# Patient Record
Sex: MALE | Race: WHITE | NOT HISPANIC OR LATINO | URBAN - METROPOLITAN AREA
[De-identification: names, ages, dates, MRNs, and addresses within clinical notes are randomized per-mention and may not be internally consistent; named-entity substitution may affect disease eponyms.]

---

## 2017-02-13 ENCOUNTER — OFFICE VISIT (OUTPATIENT)
Dept: MEDICAL GROUP | Facility: PHYSICIAN GROUP | Age: 47
End: 2017-02-13
Payer: MEDICARE

## 2017-02-13 VITALS
HEIGHT: 73 IN | DIASTOLIC BLOOD PRESSURE: 72 MMHG | HEART RATE: 82 BPM | OXYGEN SATURATION: 97 % | BODY MASS INDEX: 27.04 KG/M2 | WEIGHT: 204 LBS | SYSTOLIC BLOOD PRESSURE: 126 MMHG | TEMPERATURE: 98.6 F | RESPIRATION RATE: 16 BRPM

## 2017-02-13 DIAGNOSIS — I10 ESSENTIAL HYPERTENSION: ICD-10-CM

## 2017-02-13 DIAGNOSIS — L72.3 SEBACEOUS CYST: ICD-10-CM

## 2017-02-13 DIAGNOSIS — F91.8 CONDUCT DISORDER, UNDIFFERENTIATED TYPE: ICD-10-CM

## 2017-02-13 DIAGNOSIS — D69.3 IDIOPATHIC THROMBOCYTOPENIC PURPURA (HCC): ICD-10-CM

## 2017-02-13 DIAGNOSIS — H61.23 IMPACTED CERUMEN OF BOTH EARS: ICD-10-CM

## 2017-02-13 DIAGNOSIS — F98.8 ADD (ATTENTION DEFICIT DISORDER): ICD-10-CM

## 2017-02-13 DIAGNOSIS — F79 MENTAL RETARDATION: ICD-10-CM

## 2017-02-13 DIAGNOSIS — E78.5 DYSLIPIDEMIA: ICD-10-CM

## 2017-02-13 DIAGNOSIS — Z00.00 MEDICARE ANNUAL WELLNESS VISIT, SUBSEQUENT: ICD-10-CM

## 2017-02-13 DIAGNOSIS — L98.9 SKIN LESION OF HAND: ICD-10-CM

## 2017-02-13 PROCEDURE — 69210 REMOVE IMPACTED EAR WAX UNI: CPT | Mod: 50 | Performed by: FAMILY MEDICINE

## 2017-02-13 PROCEDURE — 4004F PT TOBACCO SCREEN RCVD TLK: CPT | Performed by: FAMILY MEDICINE

## 2017-02-13 PROCEDURE — G0439 PPPS, SUBSEQ VISIT: HCPCS | Mod: 25 | Performed by: FAMILY MEDICINE

## 2017-02-13 PROCEDURE — G8510 SCR DEP NEG, NO PLAN REQD: HCPCS | Performed by: FAMILY MEDICINE

## 2017-02-13 PROCEDURE — 99999 PR ANNUAL WELLNESS VISIT-INCLUDES PPPS SUBSEQUE*: CPT | Performed by: FAMILY MEDICINE

## 2017-02-13 RX ORDER — CLOBETASOL PROPIONATE 0.5 MG/G
CREAM TOPICAL
Qty: 30 G | Refills: 1 | Status: SHIPPED | OUTPATIENT
Start: 2017-02-13 | End: 2019-01-31 | Stop reason: SDUPTHER

## 2017-02-13 ASSESSMENT — PATIENT HEALTH QUESTIONNAIRE - PHQ9: CLINICAL INTERPRETATION OF PHQ2 SCORE: 0

## 2017-02-13 NOTE — MR AVS SNAPSHOT
"Juan J Montiel   2017 9:20 AM   Office Visit   MRN: [id number]    Department:  [facility]   Dept Phone:  [phone]    Description:  Male : 1970   Provider:  Jalyn Reaves M.D.           Reason for Visit     Annual Exam physical       Allergies as of 2017     No Known Allergies      You were diagnosed with     Medicare annual wellness visit, subsequent   [363944]       Essential hypertension   [1929746]       Dyslipidemia   [056334]       Idiopathic thrombocytopenic purpura (CMS-HCC)   [379482]       Sebaceous cyst   [706.2.ICD-9-CM]       Skin lesion of hand   [273881]       Conduct disorder, undifferentiated type   [707445]       ADD (attention deficit disorder)   [448205]         Vital Signs     Blood Pressure Pulse Temperature Respirations Height Weight    126/72 mmHg 82 37 °C (98.6 °F) 16 1.854 m (6' 1\") 92.534 kg (204 lb)    Body Mass Index Oxygen Saturation Smoking Status             26.92 kg/m2 97% Current Every Day Smoker         Basic Information     Date Of Birth Sex Race Ethnicity Preferred Language    1970 Male White Non- English      Your appointments     Aug 22, 2017  9:00 AM   Established Patient with Jalyn Reaves M.D.   [facility] (--)    [address]  [address]  [facility] [address] [phone]           You will be receiving a confirmation call a few days before your appointment from our automated call confirmation system.              Problem List              ICD-10-CM Priority Class Noted - Resolved    Mental retardation F79   Unknown - Present    Conduct disorder, undifferentiated type F91.8   Unknown - Present    ADD (attention deficit disorder) F98.8   2014 - Present    Dyslipidemia E78.5   3/3/2014 - Present    Essential hypertension I10   2015 - Present    Idiopathic thrombocytopenic purpura (CMS-HCC) D69.3   2015 - Present      Health Maintenance        Date Due Completion Dates    IMM DTaP/Tdap/Td Vaccine (2 - Td) " 5/13/2019 5/13/2009            Current Immunizations     Influenza TIV (IM) 2/4/2014    Influenza Vaccine Quad Inj (Preserved) 11/2/2016, 2/8/2016, 11/19/2014    Pneumococcal polysaccharide vaccine (PPSV-23) 5/13/2009    Tdap Vaccine 5/13/2009      Below and/or attached are the medications your provider expects you to take. Review all of your home medications and newly ordered medications with your provider and/or pharmacist. Follow medication instructions as directed by your provider and/or pharmacist. Please keep your medication list with you and share with your provider. Update the information when medications are discontinued, doses are changed, or new medications (including over-the-counter products) are added; and carry medication information at all times in the event of emergency situations     Allergies:  No Known Allergies          Medications  Valid as of: February 13, 2017 -  9:59 AM    Generic Name Brand Name Tablet Size Instructions for use    Aluminum Chloride (Solution) DRYSOL 20 % Apply to soles of feet at night then wash off the next day.        ARIPiprazole (Tab) ABILIFY 15 MG Take 15 mg by mouth every morning.        ARIPiprazole (Tab) ABILIFY 5 MG Take 5 mg by mouth every evening.        Clobetasol Prop Emollient Base (Cream) Clobetasol Prop Emollient Base 0.05 % Apply thinly to affected areas twice daily.        Clotrimazole (Cream) LOTRIMIN 1 % Apply  to affected area(s) 2 times a day. As directed.        HydroCHLOROthiazide (Cap) MICROZIDE 12.5 MG TAKE ONE CAPSULE BY MOUTH EVERYDAY        Multiple Vitamin (Tab) MULTI-VITAMINS  TAKE ONE TABLET BY MOUTH ONCE DAILY        .                 Medicines prescribed today were sent to:     [facility]  [place] [address]    [address] [facility] [address] [facility] [address]    Phone: [phone] Fax: [phone]    Open 24 Hours?: No    [facility] - [place] [address]    [address] [facility] [address] [place]  [place] [address]    Phone: [phone] Fax: [phone]    Open 24 Hours?: No      Medication refill instructions:       If your prescription bottle indicates you have medication refills left, it is not necessary to call your provider’s office. Please contact your pharmacy and they will refill your medication.    If your prescription bottle indicates you do not have any refills left, you may request refills at any time through one of the following ways: The online [employer] system (except Urgent Care), by calling your provider’s office, or by asking your pharmacy to contact your provider’s office with a refill request. Medication refills are processed only during regular business hours and may not be available until the next business day. Your provider may request additional information or to have a follow-up visit with you prior to refilling your medication.   *Please Note: Medication refills are assigned a new Rx number when refilled electronically. Your pharmacy may indicate that no refills were authorized even though a new prescription for the same medication is available at the pharmacy. Please request the medicine by name with the pharmacy before contacting your provider for a refill.        Your To Do List     Future Labs/Procedures Complete By Expires    CBC WITH DIFFERENTIAL  As directed 2/14/2018    COMP METABOLIC PANEL  As directed 2/14/2018    LIPID PROFILE  As directed 2/14/2018         [employer] Status: Patient Declined

## 2017-02-13 NOTE — PROGRESS NOTES
Subjective:      Juan J Montiel is a 46 y.o. male who presents with Annual Exam            Annual Exam     in terms of patient's hypertension, his blood pressures under control on hydrochlorothiazide.    He continues to manage his dyslipidemia with his own efforts. The last blood work in 2/16 came back all at target except for low HDL at 30. He did not do the blood work I ordered prior to this visit.    For his ITP, count ranges from 91-127K. Is followed by the hematologist. The last platelet count was 98 in February 2016.    He has a bump on the right side of the back that has been there for a while without any discomfort or pain.    He continues to have breakouts of non-itchy than painful rash on the skin overlying the MCP joints of both hands. The steroid cream that I gave them takes care of the rash and makes them go away.    He continues to follow-up with a psychiatrist for conduct disorder and ADD. He continues to take Abilify prescribed by the psychiatrist.     Depression Screening    Little interest or pleasure in doing things?  0 - not at all  Feeling down, depressed , or hopeless? 0 - not at all  Patient Health Questionnaire Score: 0     If depressive symptoms identified deferred to follow up visit unless specifically addressed in assesment and plan.    Screening for Cognitive Impairment    Three Minute Recall (banana, sunrise, fence)  2/3    Draw clock face with all 12 numbers set to the hand to show 10 minures past 11 o'clock       Cognitive concerns identified defferred for follow up unless specifically addressed in assesment and plan.    Fall Risk Assessment    Has the patient had two or more falls in the last year or any fall with injury in the last year?       Safety Assessment    Throw rugs on floor.  Yes  Handrails on all stairs.  Yes  Good lighting in all hallways.  Yes  Difficulty hearing.  No  Patient counseled about all safety risks that were identified.    Functional Assessment ADLs    Are there  any barriers preventing you from cooking for yourself or meeting nutritional needs?  Yes.    Are there any barriers preventing you from driving safely or obtaining transportation?  Yes.    Are there any barriers preventing you from using a telephone or calling for help?  Yes.    Are there any barriers preventing you from shopping?  Yes.    Are there any barriers preventing you from taking care of your own finances?  Yes.    Are there any barriers preventing you from managing your medications?  Yes.    Are currently engaing any exercise or physical activity?  No.       Health Maintenance Summary                Annual Wellness Visit Overdue 2/8/2017      Done 2/8/2016 Visit Dx: Medicare annual wellness visit, subsequent     Patient has more history with this topic...    IMM DTaP/Tdap/Td Vaccine Next Due 5/13/2019      Done 5/13/2009 Imm Admin: Tdap Vaccine          Patient Care Team:  Jalyn Reaves M.D. as PCP - General    ROS     Review of Systems  Constitutional: Negative for fever, chills, weight loss and malaise/fatigue.   HEENT: Negative for ear pain, nosebleeds, congestion, sore throat and neck pain.    Eyes: Negative for blurred vision.   Respiratory: Negative for cough, sputum production, shortness of breath and wheezing.    Cardiovascular: Negative for chest pain, palpitations, orthopnea and leg swelling.   Gastrointestinal: Negative for heartburn, nausea, vomiting and abdominal pain.   Genitourinary: Negative for dysuria, urgency and frequency.   Musculoskeletal: Negative for myalgias, back pain and joint pain.   Skin: Positive for rash without itching.   Neurological: Negative for dizziness, tingling, tremors, sensory change, focal weakness and headaches.   Endo/Heme/Allergies: Does not bruise/bleed easily.   Psychiatric/Behavioral: Negative for depression, anxiety, or memory loss.     All other systems reviewed and are negative except as in HPI.         Objective:     /72 mmHg  Pulse 82  Temp(Src)  "37 °C (98.6 °F)  Resp 16  Ht 1.854 m (6' 1\")  Wt 92.534 kg (204 lb)  BMI 26.92 kg/m2  SpO2 97%     Physical Exam   Constitutional: He is oriented to person, place, and time. He appears well-developed and well-nourished. No distress.   HENT:   Head: Normocephalic and atraumatic.   Right Ear: External ear normal.   Left Ear: External ear normal.   Nose: Nose normal.   Mouth/Throat: Oropharynx is clear and moist. No oropharyngeal exudate.   Impacted cerumen both ear canals, tympanic membranes not visible   Eyes: Conjunctivae and EOM are normal. Pupils are equal, round, and reactive to light. Right eye exhibits no discharge. Left eye exhibits no discharge. No scleral icterus.   Neck: Normal range of motion. Neck supple. No JVD present. No tracheal deviation present. No thyromegaly present.   Cardiovascular: Normal rate, regular rhythm, normal heart sounds and intact distal pulses.  Exam reveals no gallop and no friction rub.    No murmur heard.  Pulmonary/Chest: Effort normal and breath sounds normal. No stridor. No respiratory distress. He has no wheezes. He has no rales. He exhibits no tenderness.   Abdominal: Soft. Bowel sounds are normal. He exhibits no distension and no mass. There is no tenderness. There is no rebound and no guarding. Hernia confirmed negative in the right inguinal area and confirmed negative in the left inguinal area.   Genitourinary: Testes normal and penis normal. Right testis shows no mass, no swelling and no tenderness. Right testis is descended. Left testis shows no mass, no swelling and no tenderness. Left testis is descended. Circumcised. No phimosis, penile erythema or penile tenderness. No discharge found.   Musculoskeletal: Normal range of motion. He exhibits no edema or tenderness.   Lymphadenopathy:     He has no cervical adenopathy. No inguinal adenopathy noted on the right or left side.        Right: No inguinal adenopathy present.        Left: No inguinal adenopathy present. "   Neurological: He is alert and oriented to person, place, and time. He displays normal reflexes. No cranial nerve deficit. He exhibits normal muscle tone. Coordination normal.   Skin: Skin is warm and dry. Rash (positive skin colored papules grouped together on the MCP joints right third and fourth fingers) noted. He is not diaphoretic. No erythema. No pallor.   2 x 2 centimeter sebaceous cyst right thoracic region below the scapula   Psychiatric: He has a normal mood and affect. His behavior is normal. Judgment and thought content normal.   Nursing note and vitals reviewed.                   Assessment/Plan:     1. Medicare annual wellness visit, subsequent  Annual Wellness Visit - Includes PPPS Subsequent ()    LIPID PROFILE    COMP METABOLIC PANEL    CBC WITH DIFFERENTIAL   2. Essential hypertension  LIPID PROFILE    COMP METABOLIC PANEL    CBC WITH DIFFERENTIAL   3. Dyslipidemia  LIPID PROFILE    COMP METABOLIC PANEL    CBC WITH DIFFERENTIAL   4. Idiopathic thrombocytopenic purpura (CMS-HCC)  LIPID PROFILE    COMP METABOLIC PANEL    CBC WITH DIFFERENTIAL   5. Sebaceous cyst  LIPID PROFILE    COMP METABOLIC PANEL    CBC WITH DIFFERENTIAL   6. Skin lesion of hand  LIPID PROFILE    COMP METABOLIC PANEL    CBC WITH DIFFERENTIAL    Clobetasol Prop Emollient Base 0.05 % Cream   7. Conduct disorder, undifferentiated type  LIPID PROFILE    COMP METABOLIC PANEL    CBC WITH DIFFERENTIAL   8. ADD (attention deficit disorder)  LIPID PROFILE    COMP METABOLIC PANEL    CBC WITH DIFFERENTIAL   9. Impacted cerumen both ears    1. Up-to-date with all his immunizations.  2. Controlled on hydrochlorothiazide.  3. He will do follow-up blood work. Continue on efforts.  4. This is stable. Platelet count continues to run within his baseline. He follows up with the hematologist every 6 months.  5. No evidence of infection. He does not have any discomfort or pain. We will just keep an eye on this.  6. I gave him a refill obvious  steroid cream which works. When he does the cream application and the area involved the problem goes away.  7. Continue follow-up with psychiatrist.  8. Continue follow-up with psychiatrist.  9. Cerumen removed both by irrigation and scooping them out with cerumen curette with good success. Agree examination showed intact tympanic membranes without evidence of infection.  Screening tests reviewed with patient and I updated the health maintenance record. Counseling done regarding general wall mass and lifestyle habits.      Please note that this dictation was created using voice recognition software. I have worked with consultants from the vendor as well as technical experts from [facility]  [employer] to optimize the interface. I have made every reasonable attempt to correct obvious errors, but I expect that there are errors of grammar and possibly content I did not discover before finalizing the note.

## 2017-02-15 ENCOUNTER — HOSPITAL ENCOUNTER (OUTPATIENT)
Dept: LAB | Facility: MEDICAL CENTER | Age: 47
End: 2017-02-15
Attending: FAMILY MEDICINE
Payer: MEDICARE

## 2017-02-15 DIAGNOSIS — E78.5 DYSLIPIDEMIA: ICD-10-CM

## 2017-02-15 DIAGNOSIS — L98.9 SKIN LESION OF HAND: ICD-10-CM

## 2017-02-15 DIAGNOSIS — F91.8 CONDUCT DISORDER, UNDIFFERENTIATED TYPE: ICD-10-CM

## 2017-02-15 DIAGNOSIS — Z00.00 MEDICARE ANNUAL WELLNESS VISIT, SUBSEQUENT: ICD-10-CM

## 2017-02-15 DIAGNOSIS — L72.3 SEBACEOUS CYST: ICD-10-CM

## 2017-02-15 DIAGNOSIS — D69.3 IDIOPATHIC THROMBOCYTOPENIC PURPURA (HCC): ICD-10-CM

## 2017-02-15 DIAGNOSIS — I10 ESSENTIAL HYPERTENSION: ICD-10-CM

## 2017-02-15 DIAGNOSIS — F98.8 ADD (ATTENTION DEFICIT DISORDER): ICD-10-CM

## 2017-02-15 LAB
ALBUMIN SERPL BCP-MCNC: 4 G/DL (ref 3.2–4.9)
ALBUMIN/GLOB SERPL: 1.4 G/DL
ALP SERPL-CCNC: 74 U/L (ref 30–99)
ALT SERPL-CCNC: 12 U/L (ref 2–50)
ANION GAP SERPL CALC-SCNC: 6 MMOL/L (ref 0–11.9)
AST SERPL-CCNC: 14 U/L (ref 12–45)
BASOPHILS # BLD AUTO: 0.04 K/UL (ref 0–0.12)
BASOPHILS NFR BLD AUTO: 0.7 % (ref 0–1.8)
BILIRUB SERPL-MCNC: 0.7 MG/DL (ref 0.1–1.5)
BUN SERPL-MCNC: 27 MG/DL (ref 8–22)
CALCIUM SERPL-MCNC: 9.1 MG/DL (ref 8.5–10.5)
CHLORIDE SERPL-SCNC: 106 MMOL/L (ref 96–112)
CHOLEST SERPL-MCNC: 139 MG/DL (ref 100–199)
CO2 SERPL-SCNC: 27 MMOL/L (ref 20–33)
CREAT SERPL-MCNC: 0.91 MG/DL (ref 0.5–1.4)
EOSINOPHIL # BLD: 0.26 K/UL (ref 0–0.51)
EOSINOPHIL NFR BLD AUTO: 4.4 % (ref 0–6.9)
ERYTHROCYTE [DISTWIDTH] IN BLOOD BY AUTOMATED COUNT: 41.6 FL (ref 35.9–50)
GLOBULIN SER CALC-MCNC: 2.8 G/DL (ref 1.9–3.5)
GLUCOSE SERPL-MCNC: 84 MG/DL (ref 65–99)
HCT VFR BLD AUTO: 48.5 % (ref 42–52)
HDLC SERPL-MCNC: 37 MG/DL
HGB BLD-MCNC: 15.8 G/DL (ref 14–18)
IMM GRANULOCYTES # BLD AUTO: 0.02 K/UL (ref 0–0.11)
IMM GRANULOCYTES NFR BLD AUTO: 0.3 % (ref 0–0.9)
LDLC SERPL CALC-MCNC: 79 MG/DL
LYMPHOCYTES # BLD: 2.25 K/UL (ref 1–4.8)
LYMPHOCYTES NFR BLD AUTO: 38.5 % (ref 22–41)
MCH RBC QN AUTO: 30.1 PG (ref 27–33)
MCHC RBC AUTO-ENTMCNC: 32.6 G/DL (ref 33.7–35.3)
MCV RBC AUTO: 92.4 FL (ref 81.4–97.8)
MONOCYTES # BLD: 0.38 K/UL (ref 0–0.85)
MONOCYTES NFR BLD AUTO: 6.5 % (ref 0–13.4)
NEUTROPHILS # BLD: 2.9 K/UL (ref 1.82–7.42)
NEUTROPHILS NFR BLD AUTO: 49.6 % (ref 44–72)
NRBC # BLD AUTO: 0 K/UL
NRBC BLD-RTO: 0 /100 WBC
PLATELET # BLD AUTO: 116 K/UL (ref 164–446)
PMV BLD AUTO: 12 FL (ref 9–12.9)
POTASSIUM SERPL-SCNC: 3.9 MMOL/L (ref 3.6–5.5)
PROT SERPL-MCNC: 6.8 G/DL (ref 6–8.2)
RBC # BLD AUTO: 5.25 M/UL (ref 4.7–6.1)
SODIUM SERPL-SCNC: 139 MMOL/L (ref 135–145)
TRIGL SERPL-MCNC: 116 MG/DL (ref 0–149)
WBC # BLD AUTO: 5.9 K/UL (ref 4.8–10.8)

## 2017-02-15 PROCEDURE — 36415 COLL VENOUS BLD VENIPUNCTURE: CPT

## 2017-02-15 PROCEDURE — 80061 LIPID PANEL: CPT

## 2017-02-15 PROCEDURE — 85025 COMPLETE CBC W/AUTO DIFF WBC: CPT

## 2017-02-15 PROCEDURE — 80053 COMPREHEN METABOLIC PANEL: CPT

## 2017-02-16 ENCOUNTER — TELEPHONE (OUTPATIENT)
Dept: MEDICAL GROUP | Facility: PHYSICIAN GROUP | Age: 47
End: 2017-02-16

## 2017-02-16 NOTE — Clinical Note
February 16, 2017        Juan J Montiel  [address] [facility] [address]        Dear Juan J    Here are the results of your test(s):     Expand All Collapse All    ----- Message from Jalyn Reaves M.D. sent at 2/16/2017 6:45 AM PST -----   Cholesterol panel improved and HDL or good cholesterol increased from 30-37. Distal needs to be 40 or higher. Exercise or increase activity will make this better.   LDL or bad cholesterol good at 79.   Triglycerides also good at 116. This needs to be below 150.   No diabetes.   He is a little dehydrated so he needs to increase his water intake.   Liver function is normal.   No anemia.   Platelet count slightly low and about the same as before.                Sincerely,        Nora Hutchinson  Signed Electronically

## 2017-02-16 NOTE — TELEPHONE ENCOUNTER
1. Caller Name: Juan J Montiel                                         Call Back Number: [phone] (home)     2. Message: Sent letter to notify patient of normal results.     3. Patient approves office to leave a detailed voicemail/MyChart message: N\A

## 2017-02-16 NOTE — TELEPHONE ENCOUNTER
----- Message from Jalyn Reaves M.D. sent at 2/16/2017  6:45 AM PST -----  Cholesterol panel improved and HDL or good cholesterol increased from 30-37. Distal needs to be 40 or higher. Exercise or increase activity will make this better.  LDL or bad cholesterol good at 79.   Triglycerides also good at 116. This needs to be below 150.  No diabetes.  He is a little dehydrated so he needs to increase his water intake.  Liver function is normal.  No anemia.  Platelet count slightly low and about the same as before.

## 2017-03-29 RX ORDER — HYDROCHLOROTHIAZIDE 12.5 MG/1
CAPSULE, GELATIN COATED ORAL
Qty: 90 CAP | Refills: 3 | Status: SHIPPED | OUTPATIENT
Start: 2017-03-29 | End: 2018-02-23 | Stop reason: SDUPTHER

## 2017-03-29 RX ORDER — DIPHENOXYLATE HYDROCHLORIDE AND ATROPINE SULFATE 2.5; .025 MG/1; MG/1
TABLET ORAL
Qty: 90 TAB | Refills: 3 | Status: SHIPPED | OUTPATIENT
Start: 2017-03-29 | End: 2018-02-23 | Stop reason: SDUPTHER

## 2017-04-12 RX ORDER — ARIPIPRAZOLE 15 MG/1
15 TABLET ORAL EVERY MORNING
Qty: 30 TAB | Refills: 0 | OUTPATIENT
Start: 2017-04-12

## 2017-08-22 ENCOUNTER — OFFICE VISIT (OUTPATIENT)
Dept: MEDICAL GROUP | Facility: PHYSICIAN GROUP | Age: 47
End: 2017-08-22
Payer: MEDICARE

## 2017-08-22 VITALS
RESPIRATION RATE: 18 BRPM | SYSTOLIC BLOOD PRESSURE: 100 MMHG | DIASTOLIC BLOOD PRESSURE: 64 MMHG | HEIGHT: 73 IN | TEMPERATURE: 98.8 F | BODY MASS INDEX: 27.57 KG/M2 | HEART RATE: 87 BPM | WEIGHT: 208 LBS | OXYGEN SATURATION: 92 %

## 2017-08-22 DIAGNOSIS — D69.3 IDIOPATHIC THROMBOCYTOPENIC PURPURA (HCC): ICD-10-CM

## 2017-08-22 DIAGNOSIS — I10 ESSENTIAL HYPERTENSION: ICD-10-CM

## 2017-08-22 DIAGNOSIS — F98.8 ADD (ATTENTION DEFICIT DISORDER): ICD-10-CM

## 2017-08-22 DIAGNOSIS — E78.5 DYSLIPIDEMIA: ICD-10-CM

## 2017-08-22 DIAGNOSIS — F91.8 CONDUCT DISORDER, UNDIFFERENTIATED TYPE: ICD-10-CM

## 2017-08-22 PROCEDURE — 99214 OFFICE O/P EST MOD 30 MIN: CPT | Performed by: FAMILY MEDICINE

## 2017-08-22 ASSESSMENT — PAIN SCALES - GENERAL: PAINLEVEL: NO PAIN

## 2017-08-22 NOTE — MR AVS SNAPSHOT
"        Juan J Montiel   2017 9:00 AM   Office Visit   MRN: [id number]    Department:  [facility]   Dept Phone:  [phone]    Description:  Male : 1970   Provider:  Jalyn Reaves M.D.           Reason for Visit     Follow-Up 6 month follow up      Allergies as of 2017     No Known Allergies      You were diagnosed with     Essential hypertension   [8616873]       Dyslipidemia   [273493]       Idiopathic thrombocytopenic purpura (CMS-HCC)   [830517]       Conduct disorder, undifferentiated type   [528512]       ADD (attention deficit disorder)   [275320]         Vital Signs     Blood Pressure Pulse Temperature Respirations Height Weight    100/64 mmHg 87 37.1 °C (98.8 °F) 18 1.854 m (6' 0.99\") 94.348 kg (208 lb)    Body Mass Index Oxygen Saturation Smoking Status             27.45 kg/m2 92% Current Every Day Smoker         Basic Information     Date Of Birth Sex Race Ethnicity Preferred Language    1970 Male White Non- English      Your appointments     2018  9:20 AM   Established Patient with Jalyn Reaves M.D.   [facility] (--)    [address]  [address]  [facility] [address] [phone]           You will be receiving a confirmation call a few days before your appointment from our automated call confirmation system.              Problem List              ICD-10-CM Priority Class Noted - Resolved    Mental retardation F79   Unknown - Present    Conduct disorder, undifferentiated type F91.8   Unknown - Present    ADD (attention deficit disorder) F98.8   2014 - Present    Dyslipidemia E78.5   3/3/2014 - Present    Essential hypertension I10   2015 - Present    Idiopathic thrombocytopenic purpura (CMS-HCC) D69.3   2015 - Present      Health Maintenance        Date Due Completion Dates    IMM INFLUENZA (1) 2017, 2016, 2014, 2014    IMM DTaP/Tdap/Td Vaccine (2 - Td) 2019            Current Immunizations   "    Influenza TIV (IM) 2/4/2014    Influenza Vaccine Quad Inj (Preserved) 11/2/2016, 2/8/2016, 11/19/2014    Pneumococcal polysaccharide vaccine (PPSV-23) 5/13/2009    Tdap Vaccine 5/13/2009      Below and/or attached are the medications your provider expects you to take. Review all of your home medications and newly ordered medications with your provider and/or pharmacist. Follow medication instructions as directed by your provider and/or pharmacist. Please keep your medication list with you and share with your provider. Update the information when medications are discontinued, doses are changed, or new medications (including over-the-counter products) are added; and carry medication information at all times in the event of emergency situations     Allergies:  No Known Allergies          Medications  Valid as of: August 22, 2017 -  9:12 AM    Generic Name Brand Name Tablet Size Instructions for use    Aluminum Chloride (Solution) DRYSOL 20 % Apply to soles of feet at night then wash off the next day.        ARIPiprazole (Tab) ABILIFY 15 MG Take 15 mg by mouth every morning.        ARIPiprazole (Tab) ABILIFY 5 MG Take 5 mg by mouth every evening.        Clobetasol Prop Emollient Base (Cream) Clobetasol Prop Emollient Base 0.05 % Apply thinly to affected areas twice daily.        Clotrimazole (Cream) LOTRIMIN 1 % Apply  to affected area(s) 2 times a day. As directed.        HydroCHLOROthiazide (Cap) MICROZIDE 12.5 MG TAKE ONE CAPSULE BY MOUTH EVERYDAY        Multiple Vitamin (Tab) MULTI-VITAMINS  TAKE ONE TABLET BY MOUTH ONCE DAILY        .                 Medicines prescribed today were sent to:     [facility] - [address] #[facility] [address]    Phone: [phone] Fax: [phone]    Open 24 Hours?: No    [facility] - [address]    [address] [place] [address] [address] [facility] [address]    Phone: [phone] Fax: [phone]     Open 24 Hours?: No      Medication refill instructions:       If your prescription bottle indicates you have medication refills left, it is not necessary to call your provider’s office. Please contact your pharmacy and they will refill your medication.    If your prescription bottle indicates you do not have any refills left, you may request refills at any time through one of the following ways: The online [email or web address] system (except Urgent Care), by calling your provider’s office, or by asking your pharmacy to contact your provider’s office with a refill request. Medication refills are processed only during regular business hours and may not be available until the next business day. Your provider may request additional information or to have a follow-up visit with you prior to refilling your medication.   *Please Note: Medication refills are assigned a new Rx number when refilled electronically. Your pharmacy may indicate that no refills were authorized even though a new prescription for the same medication is available at the pharmacy. Please request the medicine by name with the pharmacy before contacting your provider for a refill.        Your To Do List     Future Labs/Procedures Complete By Expires    CBC WITH DIFFERENTIAL  As directed 8/23/2018    COMP METABOLIC PANEL  As directed 8/23/2018    LIPID PROFILE  As directed 8/23/2018         [email or web address] Status: Patient Declined        Quit Tobacco Information     Do you want to quit using tobacco?    Quitting tobacco decreases risks of cancer, heart and lung disease, increases life expectancy, improves sense of taste and smell, and increases spending money, among other benefits.    If you are thinking about quitting, we can help.  • [facility] Quit Tobacco Program: 909-202-6635  o Program occurs weekly for four weeks and includes pharmacist consultation on products to support quitting smoking or chewing tobacco. A provider referral is needed for pharmacist  consultation.  • Tobacco Users Help Hotline: 5-800-QUIT-NOW (760-0299) or https://[place].quitlogix.org/  o Free, confidential telephone and online coaching for [place] residents. Sessions are designed on a schedule that is convenient for you. Eligible clients receive free nicotine replacement therapy.  • Nationally: www.smokefree.gov  o Information and professional assistance to support both immediate and long-term needs as you become, and remain, a non-smoker. Smokefree.gov allows you to choose the help that best fits your needs.

## 2017-08-22 NOTE — PROGRESS NOTES
"Subjective:      Juan J Montiel is a 47 y.o. male who presents with Follow-Up            HPI     Patient returns for follow-up of his medical problems. He is accompanied by his caregiver.    He continues to take hydrochlorothiazide for hypertension with good control of his blood pressure.    In terms of his dyslipidemia, he continues to manage this with his own efforts. His main problem is low HDL. The last lipid panel in 2/17 came back the HDL increased from 30-37. The LDL increased from 51-79.    We also follow him for ITP. Platelet count ranges from 91-127k. He is also followed by his hematologist. His numbers have been stable.    He continues to see a psychiatrist for conduct disorder and ADD. He is on Abilify.    Past medical history, past surgical history, family history reviewed-no changes    Social history reviewed-no changes    Allergies reviewed-no changes    Medications reviewed-no changes        ROS     Review of systems as per history of present illness, the rest are negative.     Objective:     /64 mmHg  Pulse 87  Temp(Src) 37.1 °C (98.8 °F)  Resp 18  Ht 1.854 m (6' 0.99\")  Wt 94.348 kg (208 lb)  BMI 27.45 kg/m2  SpO2 92%     Physical Exam     Examined alert, awake, oriented, not in distress    Neck-supple, no lymphadenopathy, no thyromegaly  Lungs-clear to auscultation, no rales, no wheezes  Heart-regular rate and rhythm, no murmur  Extremities-no edema, clubbing, cyanosis          Assessment/Plan:     1. Essential hypertension  Controlled on hydrochlorothiazide.  - LIPID PROFILE; Future  - COMP METABOLIC PANEL; Future  - CBC WITH DIFFERENTIAL; Future    2. Dyslipidemia  HDL was improved but was still need this at 40 or higher. Advised to increase activity with regular exercises. Recheck lab work next visit in 6 months.  - LIPID PROFILE; Future  - COMP METABOLIC PANEL; Future  - CBC WITH DIFFERENTIAL; Future    3. Idiopathic thrombocytopenic purpura (CMS-HCC)  Last platelet count was 116k. " We will continue to follow and recheck CBC next visit.  - LIPID PROFILE; Future  - COMP METABOLIC PANEL; Future  - CBC WITH DIFFERENTIAL; Future    4. Conduct disorder, undifferentiated type  Stable on Abilify continue follow-up with psychiatrist.    5. ADD (attention deficit disorder)  Stable on Abilify. Continue follow-up with psychiatrist.      Please note that this dictation was created using voice recognition software. I have worked with consultants from the vendor as well as technical experts from [facility] to optimize the interface. I have made every reasonable attempt to correct obvious errors, but I expect that there are errors of grammar and possibly content I did not discover before finalizing the note.

## 2018-02-16 ENCOUNTER — TELEPHONE (OUTPATIENT)
Dept: MEDICAL GROUP | Facility: PHYSICIAN GROUP | Age: 48
End: 2018-02-16

## 2018-02-16 NOTE — TELEPHONE ENCOUNTER
Left message for patient to call back regarding pre-visit planning. Please transfer call to Lilia at [phone].

## 2018-02-24 RX ORDER — DIPHENOXYLATE HYDROCHLORIDE AND ATROPINE SULFATE 2.5; .025 MG/1; MG/1
TABLET ORAL
Qty: 90 TAB | Refills: 3 | Status: SHIPPED | OUTPATIENT
Start: 2018-02-24 | End: 2019-01-24 | Stop reason: SDUPTHER

## 2018-02-24 RX ORDER — HYDROCHLOROTHIAZIDE 12.5 MG/1
CAPSULE, GELATIN COATED ORAL
Qty: 90 CAP | Refills: 3 | Status: SHIPPED | OUTPATIENT
Start: 2018-02-24 | End: 2019-01-24 | Stop reason: SDUPTHER

## 2018-02-26 ENCOUNTER — OFFICE VISIT (OUTPATIENT)
Dept: MEDICAL GROUP | Facility: PHYSICIAN GROUP | Age: 48
End: 2018-02-26
Payer: MEDICARE

## 2018-02-26 VITALS
RESPIRATION RATE: 16 BRPM | HEART RATE: 72 BPM | TEMPERATURE: 98.6 F | OXYGEN SATURATION: 94 % | WEIGHT: 215.61 LBS | DIASTOLIC BLOOD PRESSURE: 82 MMHG | BODY MASS INDEX: 28.58 KG/M2 | HEIGHT: 73 IN | SYSTOLIC BLOOD PRESSURE: 112 MMHG

## 2018-02-26 DIAGNOSIS — H61.23 IMPACTED CERUMEN OF BOTH EARS: ICD-10-CM

## 2018-02-26 DIAGNOSIS — D69.3 IDIOPATHIC THROMBOCYTOPENIC PURPURA (HCC): ICD-10-CM

## 2018-02-26 DIAGNOSIS — F90.9 ATTENTION DEFICIT HYPERACTIVITY DISORDER (ADHD), UNSPECIFIED ADHD TYPE: ICD-10-CM

## 2018-02-26 DIAGNOSIS — F91.8 CONDUCT DISORDER, UNDIFFERENTIATED TYPE: ICD-10-CM

## 2018-02-26 DIAGNOSIS — F79 MENTAL RETARDATION: ICD-10-CM

## 2018-02-26 DIAGNOSIS — Z00.00 MEDICARE ANNUAL WELLNESS VISIT, SUBSEQUENT: ICD-10-CM

## 2018-02-26 DIAGNOSIS — E78.5 DYSLIPIDEMIA: ICD-10-CM

## 2018-02-26 DIAGNOSIS — I10 ESSENTIAL HYPERTENSION: ICD-10-CM

## 2018-02-26 DIAGNOSIS — Z23 NEED FOR IMMUNIZATION AGAINST INFLUENZA: ICD-10-CM

## 2018-02-26 PROCEDURE — G0008 ADMIN INFLUENZA VIRUS VAC: HCPCS | Performed by: FAMILY MEDICINE

## 2018-02-26 PROCEDURE — 90686 IIV4 VACC NO PRSV 0.5 ML IM: CPT | Performed by: FAMILY MEDICINE

## 2018-02-26 PROCEDURE — G0439 PPPS, SUBSEQ VISIT: HCPCS | Mod: 25 | Performed by: FAMILY MEDICINE

## 2018-02-26 RX ORDER — ARIPIPRAZOLE 10 MG/1
10 TABLET ORAL DAILY
COMMUNITY
End: 2018-04-04

## 2018-02-26 ASSESSMENT — PATIENT HEALTH QUESTIONNAIRE - PHQ9: CLINICAL INTERPRETATION OF PHQ2 SCORE: 0

## 2018-02-26 ASSESSMENT — ACTIVITIES OF DAILY LIVING (ADL)
SHOPPING_COMMENTS: STAFF
TRANSPORTATION COMMENTS: STAFF
BATHING_REQUIRES_ASSISTANCE: 0

## 2018-02-26 NOTE — PROGRESS NOTES
Chief Complaint   Patient presents with   • Annual Wellness Visit         HPI:  Juan J is a 47 y.o. here for Medicare Annual Wellness Visit        Patient Active Problem List    Diagnosis Date Noted   • Essential hypertension 08/05/2015   • Idiopathic thrombocytopenic purpura (CMS-HCC) 08/05/2015   • Dyslipidemia 03/03/2014   • ADD (attention deficit disorder) 02/04/2014   • Mental retardation    • Conduct disorder, undifferentiated type        Current Outpatient Prescriptions   Medication Sig Dispense Refill   • ARIPiprazole (ABILIFY) 10 MG Tab Take 10 mg by mouth every day.     • hydrochlorothiazide (MICROZIDE) 12.5 MG capsule TAKE ONE CAPSULE BY MOUTH EVERYDAY 90 Cap 3   • Multiple Vitamin (MULTI-VITAMINS) Tab TAKE ONE TABLET BY MOUTH ONCE DAILY 90 Tab 3   • Clobetasol Prop Emollient Base 0.05 % Cream Apply thinly to affected areas twice daily. 30 g 1   • aripiprazole (ABILIFY) 15 MG TABS Take 15 mg by mouth every morning.     • aluminum chloride (DRYSOL) 20 % external solution Apply to soles of feet at night then wash off the next day. 1 Bottle 3   • aripiprazole (ABILIFY) 5 MG tablet Take 10 mg by mouth every evening.     • clotrimazole (LOTRIMIN) 1 % CREA Apply  to affected area(s) 2 times a day. As directed. 45 g 2     No current facility-administered medications for this visit.         Patient is taking medications as noted in medication list.  Current supplements as per medication list.     Allergies: Patient has no known allergies.    Current social contact/activities: bowling basketball     Is patient current with immunizations? No, due for FLU. Patient is interested in receiving NONE today.    He  reports that he has been smoking Cigarettes.  He has been smoking about 0.50 packs per day. He has never used smokeless tobacco. He reports that he does not drink alcohol or use drugs.  Ready to quit: Not Answered  Counseling given: Not Answered        DPA/Advanced directive: Patient does not have an Advanced  Directive.  A packet and workshop information was given on Advanced Directives.    ROS:    Gait: Uses no assistive device   Ostomy: no   Other tubes: no   Amputations: no   Chronic oxygen use no   Last eye exam Unknown   Wears hearing aids: no     : Denies any urinary leakage during the last 6 months      Screening:        Little interest or pleasure in doing things?  0 - not at all  Feeling down, depressed, or hopeless? 0 - not at all  Patient Health Questionnaire Score: 0    If depressive symptoms identified deferred to follow up visit unless specifically addressed in assessment and plan.    Interpretation of PHQ-9 Total Score   Score Severity   1-4 No Depression   5-9 Mild Depression   10-14 Moderate Depression   15-19 Moderately Severe Depression   20-27 Severe Depression    Screening for Cognitive Impairment    Three Minute Recall (apple, watch, ike)  3/3 Village, Kitchen, Baby  Draw clock face with all 12 numbers set to the hand to show 10 minutes past 11 o'clock  1 Time 3:40  5/5  If cognitive concerns identified, deferred for follow up unless specifically addressed in assessment and plan.    Fall Risk Assessment    Has the patient had two or more falls in the last year or any fall with injury in the last year?  No  If fall risk identified, deferred for follow up unless specifically addressed in assessment and plan.    Safety Assessment    Throw rugs on floor.  No  Handrails on all stairs.  Yes  Good lighting in all hallways.  Yes  Difficulty hearing.  No  Patient counseled about all safety risks that were identified.    Functional Assessment ADLs    Are there any barriers preventing you from cooking for yourself or meeting nutritional needs?  No.    Are there any barriers preventing you from driving safely or obtaining transportation?  Yes. staff  Are there any barriers preventing you from using a telephone or calling for help?  No.    Are there any barriers preventing you from shopping?  Yes. Staff  "  Are there any barriers preventing you from taking care of your own finances?  Yes. staff  Are there any barriers preventing you from managing your medications?  Yes. staff  Are there any barriers preventing you from showering/bathing yourself?  No.    Are you currently engaging any exercise or physical activity?  No.       Health Maintenance Summary                IMM INFLUENZA Overdue 9/1/2017      Done 11/2/2016 Imm Admin: Influenza Vaccine Quad Inj (Preserved)     Patient has more history with this topic...    Annual Wellness Visit Overdue 2/16/2018      Done 2/15/2017 Visit Dx: Medicare annual wellness visit, subsequent     Patient has more history with this topic...    IMM DTaP/Tdap/Td Vaccine Next Due 5/13/2019      Done 5/13/2009 Imm Admin: Tdap Vaccine          Patient Care Team:  Jalyn Reaves M.D. as PCP - General  Dale Gay M.D. as Consulting Physician (Oncology)    Social History   Substance Use Topics   • Smoking status: Current Every Day Smoker     Packs/day: 0.50     Types: Cigarettes   • Smokeless tobacco: Never Used      Comment: started at age 21   • Alcohol use No     History reviewed. No pertinent family history.  He  has a past medical history of ADD (attention deficit disorder with hyperactivity); Conduct disorder, undifferentiated type; HTN (hypertension); Mental retardation; and Thrombocytopenia (CMS-HCC).   History reviewed. No pertinent surgical history.        Exam:     Blood pressure 112/82, pulse 72, temperature 37 °C (98.6 °F), resp. rate 16, height 1.842 m (6' 0.5\"), weight 97.8 kg (215 lb 9.8 oz), SpO2 94 %. Body mass index is 28.84 kg/m².    Hearing good.    Dentition fair  Alert, oriented in no acute distress.  Eye contact is good, speech goal directed, affect calm    Skin:                 Warm, no rashes in visible areas    Head:               Atraumatic, normocephalic    Eyes:               Pupils equal, round and reactive to light, extraocular muscles movement              "              intact, clear conjunctivae    Ears:               Excessive cerumen in both ear canals, tympanic membranes not visible    Nose:              No nasal discharge, no obstruction    Mouth:             No oral lesions    Throat:            Tonsils not enlarged, no exudates    Neck:              Trachea midline, supple, no lymphadenopathy, no thyromegaly    Lungs:             Clear to auscultation, no rales, no wheezes, no rhonchi    Heart:              Regular rate and rhythm, no murmur    Abdomen:       Good bowel sounds, soft, nontender, no hepatosplenomegaly, no masses    Extremities:    No edema, no clubbing, no cyanosis    Psych:            Alert and oriented x3, normal affect    Neuro:            Cranial nerves intact, Motor strength 5/5 upper and lower extremities,                                 DTRs 2+, sensation intact to light touch, negative Romberg  Assessment and Plan. The following treatment and monitoring plan is recommended:      1. Medicare annual wellness visit, subsequent  Flu shot was given today. Patient is up-to-date with the rest of the immunizations.    2. Impacted cerumen of both ears  I removed the cerumen using a cerumen curette. I was unable to remove all in both ear canals so I will have him return for another visit for ear lavage/irrigation.    3. Dyslipidemia  Last blood work was in February 2017 that came back all at target except HDL was still running low at 37. We will do follow-up blood work as previously ordered. We will calculate ten-year ASCVD risk and will treat depending on results.    4. Essential hypertension  Controlled on hydrochlorothiazide.    5. Idiopathic thrombocytopenic purpura (CMS-HCC)  Stable mild thrombocytopenia ranging from 91-127K. The most recent platelet count was 116k on 2/17. He will do follow-up CBC as soon as possible.    6. Conduct disorder, undifferentiated type  Is followed by his psychiatrist. He is on Abilify.    7. Attention deficit  hyperactivity disorder (ADHD), unspecified ADHD type  Followed by psychiatrist and on Abilify.    8. Mental retardation  He lives in a group home. He comes with caregiver to his appointments. He does all ADLs.    9. Need for immunization against influenza  Flu shot was given today.      Services suggested: No services needed at this time  Health Care Screening recommendations as per orders if indicated.  Referrals offered: PT/OT/Nutrition counseling/Behavioral Health/Smoking cessation as per orders if indicated.    Discussion today about general wellness and lifestyle habits:    · Prevent falls and reduce trip hazards; Cautioned about securing or removing rugs.  · Have a working fire alarm and carbon monoxide detector;   · Engage in regular physical activity and social activities       Follow-up: He will return for another visit for ear lavage.      Please note that this dictation was created using voice recognition software. I have worked with consultants from the vendor as well as technical experts from [facility] to optimize the interface. I have made every reasonable attempt to correct obvious errors, but I expect that there are errors of grammar and possibly content I did not discover before finalizing the note.

## 2018-02-27 NOTE — PATIENT INSTRUCTIONS
Patient instructions given regarding labs, x-rays, medications, referral and followup appointment.jeff and

## 2018-03-27 ENCOUNTER — HOSPITAL ENCOUNTER (OUTPATIENT)
Dept: LAB | Facility: MEDICAL CENTER | Age: 48
End: 2018-03-27
Attending: FAMILY MEDICINE
Payer: MEDICARE

## 2018-03-27 DIAGNOSIS — I10 ESSENTIAL HYPERTENSION: ICD-10-CM

## 2018-03-27 DIAGNOSIS — E78.5 DYSLIPIDEMIA: ICD-10-CM

## 2018-03-27 DIAGNOSIS — D69.3 IDIOPATHIC THROMBOCYTOPENIC PURPURA (HCC): ICD-10-CM

## 2018-03-27 LAB
ALBUMIN SERPL BCP-MCNC: 4.1 G/DL (ref 3.2–4.9)
ALBUMIN/GLOB SERPL: 1.6 G/DL
ALP SERPL-CCNC: 67 U/L (ref 30–99)
ALT SERPL-CCNC: 17 U/L (ref 2–50)
ANION GAP SERPL CALC-SCNC: 7 MMOL/L (ref 0–11.9)
AST SERPL-CCNC: 17 U/L (ref 12–45)
BASOPHILS # BLD AUTO: 0.7 % (ref 0–1.8)
BASOPHILS # BLD: 0.04 K/UL (ref 0–0.12)
BILIRUB SERPL-MCNC: 0.5 MG/DL (ref 0.1–1.5)
BUN SERPL-MCNC: 23 MG/DL (ref 8–22)
CALCIUM SERPL-MCNC: 8.9 MG/DL (ref 8.5–10.5)
CHLORIDE SERPL-SCNC: 109 MMOL/L (ref 96–112)
CHOLEST SERPL-MCNC: 146 MG/DL (ref 100–199)
CO2 SERPL-SCNC: 28 MMOL/L (ref 20–33)
CREAT SERPL-MCNC: 0.98 MG/DL (ref 0.5–1.4)
EOSINOPHIL # BLD AUTO: 0.28 K/UL (ref 0–0.51)
EOSINOPHIL NFR BLD: 5.1 % (ref 0–6.9)
ERYTHROCYTE [DISTWIDTH] IN BLOOD BY AUTOMATED COUNT: 43 FL (ref 35.9–50)
GLOBULIN SER CALC-MCNC: 2.5 G/DL (ref 1.9–3.5)
GLUCOSE SERPL-MCNC: 83 MG/DL (ref 65–99)
HCT VFR BLD AUTO: 48.6 % (ref 42–52)
HDLC SERPL-MCNC: 35 MG/DL
HGB BLD-MCNC: 15.9 G/DL (ref 14–18)
IMM GRANULOCYTES # BLD AUTO: 0.02 K/UL (ref 0–0.11)
IMM GRANULOCYTES NFR BLD AUTO: 0.4 % (ref 0–0.9)
LDLC SERPL CALC-MCNC: 84 MG/DL
LYMPHOCYTES # BLD AUTO: 1.67 K/UL (ref 1–4.8)
LYMPHOCYTES NFR BLD: 30.6 % (ref 22–41)
MCH RBC QN AUTO: 30.3 PG (ref 27–33)
MCHC RBC AUTO-ENTMCNC: 32.7 G/DL (ref 33.7–35.3)
MCV RBC AUTO: 92.6 FL (ref 81.4–97.8)
MONOCYTES # BLD AUTO: 0.34 K/UL (ref 0–0.85)
MONOCYTES NFR BLD AUTO: 6.2 % (ref 0–13.4)
NEUTROPHILS # BLD AUTO: 3.11 K/UL (ref 1.82–7.42)
NEUTROPHILS NFR BLD: 57 % (ref 44–72)
NRBC # BLD AUTO: 0 K/UL
NRBC BLD-RTO: 0 /100 WBC
PLATELET # BLD AUTO: 114 K/UL (ref 164–446)
PMV BLD AUTO: 11.6 FL (ref 9–12.9)
POTASSIUM SERPL-SCNC: 3.9 MMOL/L (ref 3.6–5.5)
PROT SERPL-MCNC: 6.6 G/DL (ref 6–8.2)
RBC # BLD AUTO: 5.25 M/UL (ref 4.7–6.1)
SODIUM SERPL-SCNC: 144 MMOL/L (ref 135–145)
TRIGL SERPL-MCNC: 135 MG/DL (ref 0–149)
WBC # BLD AUTO: 5.5 K/UL (ref 4.8–10.8)

## 2018-03-27 PROCEDURE — 36415 COLL VENOUS BLD VENIPUNCTURE: CPT

## 2018-03-27 PROCEDURE — 80053 COMPREHEN METABOLIC PANEL: CPT

## 2018-03-27 PROCEDURE — 85025 COMPLETE CBC W/AUTO DIFF WBC: CPT

## 2018-03-27 PROCEDURE — 80061 LIPID PANEL: CPT

## 2018-03-28 ENCOUNTER — TELEPHONE (OUTPATIENT)
Dept: MEDICAL GROUP | Facility: PHYSICIAN GROUP | Age: 48
End: 2018-03-28

## 2018-03-28 NOTE — TELEPHONE ENCOUNTER
----- Message from Marivel Reaves M.D. sent at 3/28/2018  8:08 AM PDT -----  Please let patient know that his lab results are stable and do not show any new or concerning changes. I recommend that he keep his follow-up appointment with Dr. Reaves next week.  -Dr. Reaves covering for Dr. Reaves

## 2018-04-03 ENCOUNTER — TELEPHONE (OUTPATIENT)
Dept: MEDICAL GROUP | Facility: PHYSICIAN GROUP | Age: 48
End: 2018-04-03

## 2018-04-03 NOTE — TELEPHONE ENCOUNTER
ESTABLISHED PATIENT PRE-VISIT PLANNING     Note: Patient will not be contacted if there is no indication to call.     1.  Reviewed notes from the last few office visits within the medical group: Yes    2.  If any orders were placed at last visit or intended to be done for this visit (i.e. 6 mos follow-up), do we have Results/Consult Notes?        •  Labs - Labs ordered, completed on 03/27/18 and results are in chart.   Note: If patient appointment is for lab review and patient did not complete labs, check with provider if OK to reschedule patient until labs completed.       •  Imaging - Imaging was not ordered at last office visit.       •  Referrals - No referrals were ordered at last office visit.    3. Is this appointment scheduled as a Hospital Follow-Up? No    4.  Immunizations were updated in [place] using WebIZ?: Yes       •  Web Iz Recommendations: MMR ,Td, CPOX    5.  Patient is due for the following Health Maintenance Topics:   There are no preventive care reminders to display for this patient.    - Patient has completed FLU, PNEUMOVAX (PPSV23) and TDAP Immunization(s) per WebIZ. Chart has been updated.    6.  MDX printed for Provider? YES    7.  Patient was NOT informed to arrive 15 min prior to their scheduled appointment and bring in their medication bottles.

## 2018-04-04 ENCOUNTER — OFFICE VISIT (OUTPATIENT)
Dept: MEDICAL GROUP | Facility: PHYSICIAN GROUP | Age: 48
End: 2018-04-04
Payer: MEDICARE

## 2018-04-04 VITALS
SYSTOLIC BLOOD PRESSURE: 100 MMHG | HEART RATE: 79 BPM | BODY MASS INDEX: 24.34 KG/M2 | WEIGHT: 206.13 LBS | OXYGEN SATURATION: 96 % | HEIGHT: 77 IN | DIASTOLIC BLOOD PRESSURE: 60 MMHG | TEMPERATURE: 97.8 F

## 2018-04-04 DIAGNOSIS — I10 ESSENTIAL HYPERTENSION: ICD-10-CM

## 2018-04-04 DIAGNOSIS — H61.23 IMPACTED CERUMEN OF BOTH EARS: ICD-10-CM

## 2018-04-04 DIAGNOSIS — E78.5 DYSLIPIDEMIA: ICD-10-CM

## 2018-04-04 DIAGNOSIS — D69.3 IDIOPATHIC THROMBOCYTOPENIC PURPURA (HCC): ICD-10-CM

## 2018-04-04 PROCEDURE — 69210 REMOVE IMPACTED EAR WAX UNI: CPT | Performed by: FAMILY MEDICINE

## 2018-04-04 PROCEDURE — 99214 OFFICE O/P EST MOD 30 MIN: CPT | Mod: 25 | Performed by: FAMILY MEDICINE

## 2018-04-05 NOTE — PROGRESS NOTES
"Subjective:      Juan J Montiel is a 48 y.o. male who presents with Other (Cerumen impaction)            HPI     Patient is here for irrigation due to impacted cerumen both ear canals. We have found this problem when we saw him for his wellness visit about a month and a half ago.    He is also here for follow-up of his medical problems and his blood work.    His blood pressure is under control on hydrochlorothiazide. He continues to manage his dyslipidemia with his own efforts only.    We follow him for ITP. He is also followed by his hematologist. Platelet count has been stable from 91-127k. The most recent one was in February 2017 which was 116. He did CBC prior to this visit.    Past medical history, past surgical history, family history reviewed-no changes    Social history reviewed-no changes    Allergies reviewed-no changes    Medications reviewed-no changes        ROS     As per history of present illness, the rest are negative.       Objective:     /60   Pulse 79   Temp 36.6 °C (97.8 °F)   Ht 1.956 m (6' 5\")   Wt 93.5 kg (206 lb 2.1 oz)   SpO2 96%   BMI 24.44 kg/m²      Physical Exam     Examined alert, awake, oriented, not in distress    Ears-excessive cerumen both ear canals, tympanic membranes not visible  Neck-supple, no lymphadenopathy, no thyromegaly  Lungs-clear to auscultation, no rales, no wheezes  Heart-regular rate and rhythm, no murmur  Extremities-no edema, clubbing, cyanosis       Hospital Outpatient Visit on 03/27/2018   Component Date Value   • Cholesterol,Tot 03/27/2018 146    • Triglycerides 03/27/2018 135    • HDL 03/27/2018 35*   • LDL 03/27/2018 84    • Sodium 03/27/2018 144    • Potassium 03/27/2018 3.9    • Chloride 03/27/2018 109    • Co2 03/27/2018 28    • Anion Gap 03/27/2018 7.0    • Glucose 03/27/2018 83    • Bun 03/27/2018 23*   • Creatinine 03/27/2018 0.98    • Calcium 03/27/2018 8.9    • AST(SGOT) 03/27/2018 17    • ALT(SGPT) 03/27/2018 17    • Alkaline Phosphatase " 03/27/2018 67    • Total Bilirubin 03/27/2018 0.5    • Albumin 03/27/2018 4.1    • Total Protein 03/27/2018 6.6    • Globulin 03/27/2018 2.5    • A-G Ratio 03/27/2018 1.6    • WBC 03/27/2018 5.5    • RBC 03/27/2018 5.25    • Hemoglobin 03/27/2018 15.9    • Hematocrit 03/27/2018 48.6    • MCV 03/27/2018 92.6    • MCH 03/27/2018 30.3    • MCHC 03/27/2018 32.7*   • RDW 03/27/2018 43.0    • Platelet Count 03/27/2018 114*   • MPV 03/27/2018 11.6    • Neutrophils-Polys 03/27/2018 57.00    • Lymphocytes 03/27/2018 30.60    • Monocytes 03/27/2018 6.20    • Eosinophils 03/27/2018 5.10    • Basophils 03/27/2018 0.70    • Immature Granulocytes 03/27/2018 0.40    • Nucleated RBC 03/27/2018 0.00    • Neutrophils (Absolute) 03/27/2018 3.11    • Lymphs (Absolute) 03/27/2018 1.67    • Monos (Absolute) 03/27/2018 0.34    • Eos (Absolute) 03/27/2018 0.28    • Baso (Absolute) 03/27/2018 0.04    • Immature Granulocytes (a* 03/27/2018 0.02    • NRBC (Absolute) 03/27/2018 0.00    • GFR If  03/27/2018 >60    • GFR If Non  Ameri* 03/27/2018 >60         Assessment/Plan:     1. Impacted cerumen of both ears  Both ear canals were irrigated with warm water mixed with hydrogen peroxide. Cerumen removed both by irrigation and by scooping them out using cerumen curette with good success. Reexamination showed intact slightly erythematous tympanic membranes bilaterally. Advised not to use any Q-tip in the next 2 weeks. May use Q-tip to clean the ears every 2 weeks only.    2. Essential hypertension  Controlled on his medication.    3. Dyslipidemia  10 year ASCVD rest 4.1%. No need to treat with statin. Advised to increase activity to improve the HDL.    4. Idiopathic thrombocytopenic purpura (CMS-HCC)  Stable platelet count. We will continue to follow his CBC yearly. He will also follow-up with his hematologist.      Please note that this dictation was created using voice recognition software. I have worked with consultants  from the vendor as well as technical experts from [facility] to optimize the interface. I have made every reasonable attempt to correct obvious errors, but I expect that there are errors of grammar and possibly content I did not discover before finalizing the note.

## 2018-10-04 ENCOUNTER — TELEPHONE (OUTPATIENT)
Dept: MEDICAL GROUP | Facility: PHYSICIAN GROUP | Age: 48
End: 2018-10-04

## 2018-10-04 NOTE — TELEPHONE ENCOUNTER
He can drop the form, but it is not going to complete until Dr. Reaves is here, and she might need him for apt. We cannot tell, without the form, and which is Dr. Reaves preference.  I think is best for pt to have an apt to be safer  Thank you,  Mignon Ayala M.D.

## 2018-10-04 NOTE — TELEPHONE ENCOUNTER
VOICEMAIL  1. Caller Name: [facility]                    Call Back Number: [phone]    2. Message: Tena called to see if  would be willing to complete form stating patient is ok to continue special olympics bowling.  Last appt was 4/4/18.  She would like a call back letting her know if she can drop off form or if patient requires another appt to complete.     3. Patient approves office to leave a detailed voicemail/MyChart message: yes

## 2018-10-10 ENCOUNTER — OFFICE VISIT (OUTPATIENT)
Dept: MEDICAL GROUP | Facility: PHYSICIAN GROUP | Age: 48
End: 2018-10-10
Payer: MEDICARE

## 2018-10-10 VITALS
DIASTOLIC BLOOD PRESSURE: 60 MMHG | OXYGEN SATURATION: 95 % | BODY MASS INDEX: 28.34 KG/M2 | HEIGHT: 72 IN | HEART RATE: 67 BPM | TEMPERATURE: 98.4 F | SYSTOLIC BLOOD PRESSURE: 110 MMHG | WEIGHT: 209.22 LBS

## 2018-10-10 DIAGNOSIS — D69.3 IDIOPATHIC THROMBOCYTOPENIC PURPURA (HCC): ICD-10-CM

## 2018-10-10 DIAGNOSIS — E78.5 DYSLIPIDEMIA: ICD-10-CM

## 2018-10-10 DIAGNOSIS — I10 ESSENTIAL HYPERTENSION: ICD-10-CM

## 2018-10-10 PROCEDURE — 99214 OFFICE O/P EST MOD 30 MIN: CPT | Performed by: FAMILY MEDICINE

## 2018-10-11 NOTE — PROGRESS NOTES
Subjective:      Juan J Montiel is a 48 y.o. male who presents with Hypertension            HPI     Patient is here for follow-up of his medical problems.  He is accompanied by his care giver.    He also needs form filled out so he can be cleared to participate in [employer].  His sport is bowling.    In terms of his hypertension, he continues to take hydrochlorothiazide with good control of the blood pressure.    He has dyslipidemia specifically low HDL which is managed with his own efforts.  The last lipid panel was in March 2018 that came back all at target except for HDL was still low at 35.    We follow him for chronic ITP.  Platelet count ranges from 91-127k.  Followed by his hematologist.  The last platelet count was in March 2018 which was 114k.    Past medical history, past surgical history, family history reviewed-no changes    Social history reviewed-no changes    Allergies reviewed-no changes    Medications reviewed-no changes      ROS     Per HPI, the rest are negative.     Objective:     /60 (BP Location: Left arm, Patient Position: Sitting, BP Cuff Size: Adult)   Pulse 67   Temp 36.9 °C (98.4 °F) (Temporal)   Ht 1.829 m (6')   Wt 94.9 kg (209 lb 3.5 oz)   SpO2 95%   BMI 28.37 kg/m²      Physical Exam   Constitutional: He is oriented to person, place, and time. He appears well-developed and well-nourished. No distress.   HENT:   Head: Normocephalic and atraumatic.   Right Ear: External ear normal.   Left Ear: External ear normal.   Nose: Nose normal.   Mouth/Throat: Oropharynx is clear and moist. No oropharyngeal exudate.   Eyes: Pupils are equal, round, and reactive to light. Conjunctivae and EOM are normal. Right eye exhibits no discharge. Left eye exhibits no discharge. No scleral icterus.   Neck: Normal range of motion. Neck supple. No JVD present. No tracheal deviation present. No thyromegaly present.   Cardiovascular: Normal rate, regular rhythm, normal heart sounds and intact  distal pulses.  Exam reveals no gallop and no friction rub.    No murmur heard.  Pulmonary/Chest: Effort normal and breath sounds normal. No stridor. No respiratory distress. He has no wheezes. He has no rales. He exhibits no tenderness.   Abdominal: Soft. Bowel sounds are normal. He exhibits no distension and no mass. There is no tenderness. There is no rebound and no guarding. No hernia.   Musculoskeletal: Normal range of motion. He exhibits no edema, tenderness or deformity.   Lymphadenopathy:     He has no cervical adenopathy.   Neurological: He is alert and oriented to person, place, and time. He has normal reflexes. He displays normal reflexes. No cranial nerve deficit. He exhibits normal muscle tone. Coordination normal.   Skin: Skin is warm and dry. No rash noted. He is not diaphoretic. No erythema. No pallor.   Psychiatric: He has a normal mood and affect. His behavior is normal. Judgment and thought content normal.   Nursing note and vitals reviewed.                   Assessment/Plan:     1. Essential hypertension  Controlled on his medication.  - LIPID PROFILE; Future  - COMP METABOLIC PANEL; Future  - CBC WITH DIFFERENTIAL; Future    2. Dyslipidemia  We will do follow-up lipid panel next visit.  We will continue to manage with regular exercises to get the HDL to improve.  - LIPID PROFILE; Future  - COMP METABOLIC PANEL; Future  - CBC WITH DIFFERENTIAL; Future    3. Idiopathic thrombocytopenic purpura (HCC)  This is stable over the years.  We will follow along with the hematologist.  - LIPID PROFILE; Future  - COMP METABOLIC PANEL; Future  - CBC WITH DIFFERENTIAL; Future    Filled out his paperwork for clearance for [employer].  Form is on file.      Please note that this dictation was created using voice recognition software. I have worked with consultants from the vendor as well as technical experts from [employer] to optimize the interface. I have made every reasonable attempt to correct  obvious errors, but I expect that there are errors of grammar and possibly content I did not discover before finalizing the note.

## 2018-10-11 NOTE — PATIENT INSTRUCTIONS
Indication: Pain s/p fall 2 weeks ago



Right hip with pelvis radiographs 



Comparison: None available 



Findings: 



No acute displaced fracture or dislocation identified.  Sacroiliac 

joints appear intact.  Mild constipation.  Soft tissues appear 

unremarkable.  No evidence of radiopaque foreign body. 



Impression: 



No acute displaced fracture or dislocation evident.  If high clinical 

index of suspicion, suggest cross-sectional imaging for further 

evaluation.  Otherwise, if symptoms persist or if there is continued 

clinical concern, x-ray follow-up in 7-10 days should be considered. Patient instructions given regarding labs, x-rays, medications, referral and followup appointment.

## 2019-01-30 ENCOUNTER — HOSPITAL ENCOUNTER (OUTPATIENT)
Dept: LAB | Facility: MEDICAL CENTER | Age: 49
End: 2019-01-30
Attending: FAMILY MEDICINE
Payer: MEDICARE

## 2019-01-30 DIAGNOSIS — I10 ESSENTIAL HYPERTENSION: ICD-10-CM

## 2019-01-30 DIAGNOSIS — E78.5 DYSLIPIDEMIA: ICD-10-CM

## 2019-01-30 DIAGNOSIS — D69.3 IDIOPATHIC THROMBOCYTOPENIC PURPURA (HCC): ICD-10-CM

## 2019-01-30 LAB
ALBUMIN SERPL BCP-MCNC: 4.1 G/DL (ref 3.2–4.9)
ALBUMIN/GLOB SERPL: 1.8 G/DL
ALP SERPL-CCNC: 67 U/L (ref 30–99)
ALT SERPL-CCNC: 21 U/L (ref 2–50)
ANION GAP SERPL CALC-SCNC: 6 MMOL/L (ref 0–11.9)
AST SERPL-CCNC: 16 U/L (ref 12–45)
BASOPHILS # BLD AUTO: 0.5 % (ref 0–1.8)
BASOPHILS # BLD: 0.03 K/UL (ref 0–0.12)
BILIRUB SERPL-MCNC: 0.3 MG/DL (ref 0.1–1.5)
BUN SERPL-MCNC: 29 MG/DL (ref 8–22)
CALCIUM SERPL-MCNC: 9.2 MG/DL (ref 8.5–10.5)
CHLORIDE SERPL-SCNC: 109 MMOL/L (ref 96–112)
CHOLEST SERPL-MCNC: 142 MG/DL (ref 100–199)
CO2 SERPL-SCNC: 28 MMOL/L (ref 20–33)
CREAT SERPL-MCNC: 0.88 MG/DL (ref 0.5–1.4)
EOSINOPHIL # BLD AUTO: 0.26 K/UL (ref 0–0.51)
EOSINOPHIL NFR BLD: 4.4 % (ref 0–6.9)
ERYTHROCYTE [DISTWIDTH] IN BLOOD BY AUTOMATED COUNT: 43.8 FL (ref 35.9–50)
FASTING STATUS PATIENT QL REPORTED: NORMAL
GLOBULIN SER CALC-MCNC: 2.3 G/DL (ref 1.9–3.5)
GLUCOSE SERPL-MCNC: 84 MG/DL (ref 65–99)
HCT VFR BLD AUTO: 48.8 % (ref 42–52)
HDLC SERPL-MCNC: 42 MG/DL
HGB BLD-MCNC: 15.9 G/DL (ref 14–18)
IMM GRANULOCYTES # BLD AUTO: 0.05 K/UL (ref 0–0.11)
IMM GRANULOCYTES NFR BLD AUTO: 0.9 % (ref 0–0.9)
LDLC SERPL CALC-MCNC: 72 MG/DL
LYMPHOCYTES # BLD AUTO: 2.05 K/UL (ref 1–4.8)
LYMPHOCYTES NFR BLD: 35 % (ref 22–41)
MCH RBC QN AUTO: 30.8 PG (ref 27–33)
MCHC RBC AUTO-ENTMCNC: 32.6 G/DL (ref 33.7–35.3)
MCV RBC AUTO: 94.6 FL (ref 81.4–97.8)
MONOCYTES # BLD AUTO: 0.49 K/UL (ref 0–0.85)
MONOCYTES NFR BLD AUTO: 8.4 % (ref 0–13.4)
NEUTROPHILS # BLD AUTO: 2.98 K/UL (ref 1.82–7.42)
NEUTROPHILS NFR BLD: 50.8 % (ref 44–72)
NRBC # BLD AUTO: 0 K/UL
NRBC BLD-RTO: 0 /100 WBC
PLATELET # BLD AUTO: 103 K/UL (ref 164–446)
PMV BLD AUTO: 12 FL (ref 9–12.9)
POTASSIUM SERPL-SCNC: 3.8 MMOL/L (ref 3.6–5.5)
PROT SERPL-MCNC: 6.4 G/DL (ref 6–8.2)
RBC # BLD AUTO: 5.16 M/UL (ref 4.7–6.1)
SODIUM SERPL-SCNC: 143 MMOL/L (ref 135–145)
TRIGL SERPL-MCNC: 140 MG/DL (ref 0–149)
WBC # BLD AUTO: 5.9 K/UL (ref 4.8–10.8)

## 2019-01-30 PROCEDURE — 80053 COMPREHEN METABOLIC PANEL: CPT

## 2019-01-30 PROCEDURE — 36415 COLL VENOUS BLD VENIPUNCTURE: CPT

## 2019-01-30 PROCEDURE — 80061 LIPID PANEL: CPT

## 2019-01-30 PROCEDURE — 85025 COMPLETE CBC W/AUTO DIFF WBC: CPT

## 2019-01-31 ENCOUNTER — TELEPHONE (OUTPATIENT)
Dept: MEDICAL GROUP | Facility: PHYSICIAN GROUP | Age: 49
End: 2019-01-31

## 2019-01-31 DIAGNOSIS — L98.9 SKIN LESION OF HAND: ICD-10-CM

## 2019-01-31 NOTE — TELEPHONE ENCOUNTER
Phone Number Called: [phone] (home)       Message: Left generic voice message for the pt to call back re results.    Left Message for patient to call back: yes

## 2019-01-31 NOTE — TELEPHONE ENCOUNTER
----- Message from Jalyn Reaves M.D. sent at 1/30/2019  6:09 PM PST -----  The blood work came back cholesterol panel improved and good cholesterol increased from 35-42.  The goal is 40 or higher therefore this is already at goal.  To keep it at the right level patient has to do cardio exercises 30 minutes a day 4-5 days a week.  The rest of the cholesterol panel are at goal.  No diabetes.  Liver and kidney functions are normal.  He is slightly dehydrated so he needs to drink more water.  No anemia.  Mildly low platelet count which is stable over the years.

## 2019-02-01 RX ORDER — CLOBETASOL PROPIONATE 0.5 MG/G
EMULSION TOPICAL
Qty: 30 G | Refills: 0 | Status: SHIPPED | OUTPATIENT
Start: 2019-02-01 | End: 2019-07-19

## 2019-02-06 NOTE — TELEPHONE ENCOUNTER
Phone Number Called: [phone] (home)   Message: Left generic voice message for the pt to call back re results.   Left Message for patient to call back: yes      Attempt Number 2

## 2019-02-08 NOTE — TELEPHONE ENCOUNTER
Phone Number Called: [phone] (home)   Message: Left generic voice message for the pt to call back re results.   Left Message for patient to call back: yes        Attempt Number 3       Letter sent

## 2019-04-10 ENCOUNTER — APPOINTMENT (OUTPATIENT)
Dept: MEDICAL GROUP | Facility: PHYSICIAN GROUP | Age: 49
End: 2019-04-10
Payer: MEDICARE

## 2019-04-10 ENCOUNTER — TELEPHONE (OUTPATIENT)
Dept: MEDICAL GROUP | Facility: PHYSICIAN GROUP | Age: 49
End: 2019-04-10

## 2019-04-15 ENCOUNTER — OFFICE VISIT (OUTPATIENT)
Dept: MEDICAL GROUP | Facility: PHYSICIAN GROUP | Age: 49
End: 2019-04-15
Payer: MEDICARE

## 2019-04-15 VITALS
HEART RATE: 85 BPM | HEIGHT: 71 IN | RESPIRATION RATE: 16 BRPM | WEIGHT: 231.7 LBS | TEMPERATURE: 99 F | OXYGEN SATURATION: 93 % | DIASTOLIC BLOOD PRESSURE: 82 MMHG | BODY MASS INDEX: 32.44 KG/M2 | SYSTOLIC BLOOD PRESSURE: 120 MMHG

## 2019-04-15 DIAGNOSIS — I10 ESSENTIAL HYPERTENSION: ICD-10-CM

## 2019-04-15 DIAGNOSIS — F90.9 ATTENTION DEFICIT HYPERACTIVITY DISORDER (ADHD), UNSPECIFIED ADHD TYPE: ICD-10-CM

## 2019-04-15 DIAGNOSIS — D69.3 IDIOPATHIC THROMBOCYTOPENIC PURPURA (HCC): ICD-10-CM

## 2019-04-15 DIAGNOSIS — Z00.00 MEDICARE ANNUAL WELLNESS VISIT, SUBSEQUENT: ICD-10-CM

## 2019-04-15 DIAGNOSIS — E66.9 OBESITY (BMI 30-39.9): ICD-10-CM

## 2019-04-15 DIAGNOSIS — E78.5 DYSLIPIDEMIA: ICD-10-CM

## 2019-04-15 DIAGNOSIS — F79 MENTAL RETARDATION: ICD-10-CM

## 2019-04-15 DIAGNOSIS — F91.8 CONDUCT DISORDER, UNDIFFERENTIATED TYPE: ICD-10-CM

## 2019-04-15 PROCEDURE — G0439 PPPS, SUBSEQ VISIT: HCPCS | Performed by: FAMILY MEDICINE

## 2019-04-15 PROCEDURE — 8041 PR SCP AHA: Performed by: FAMILY MEDICINE

## 2019-04-15 ASSESSMENT — ACTIVITIES OF DAILY LIVING (ADL)
BATHING_REQUIRES_ASSISTANCE: 0
TRANSPORTATION COMMENTS: CAREGIVER

## 2019-04-15 ASSESSMENT — ENCOUNTER SYMPTOMS: GENERAL WELL-BEING: EXCELLENT

## 2019-04-15 ASSESSMENT — PATIENT HEALTH QUESTIONNAIRE - PHQ9
5. POOR APPETITE OR OVEREATING: 0 - NOT AT ALL
CLINICAL INTERPRETATION OF PHQ2 SCORE: 6

## 2019-04-15 NOTE — PROGRESS NOTES
Annual Health Assessment Questions:    1.  Are you currently engaging in any exercise or physical activity? No    2.  How would you describe your mood or emotional well-being today? anxious    3.  Have you had any falls in the last year? No    4.  Have you noticed any problems with your balance or had difficulty walking? No    5.  In the last six months have you experienced any leakage of urine? No    6. DPA/Advanced Directive: Patient does not have an Advanced Directive.  A packet and workshop information was given on Advanced Directives.

## 2019-04-15 NOTE — PROGRESS NOTES
Chief Complaint   Patient presents with   • Annual Wellness Visit         HPI:  Juan J is a 49 y.o. here for Medicare Annual Wellness Visit    Annual Health Assessment Questions:    1.  Are you currently engaging in any exercise or physical activity? No    2.  How would you describe your mood or emotional well-being today? anxious    3.  Have you had any falls in the last year? No    4.  Have you noticed any problems with your balance or had difficulty walking? No    5.  In the last six months have you experienced any leakage of urine? No    6. DPA/Advanced Directive: Patient does not have an Advanced Directive.  A packet and workshop information was given on Advanced Directives.    Patient Active Problem List    Diagnosis Date Noted   • Essential hypertension 08/05/2015   • Idiopathic thrombocytopenic purpura (HCC) 08/05/2015   • Dyslipidemia 03/03/2014   • ADD (attention deficit disorder) 02/04/2014   • Mental retardation    • Conduct disorder, undifferentiated type        Current Outpatient Prescriptions   Medication Sig Dispense Refill   • CLOBETASOL PROPIONATE E 0.05 % Cream APPLY A THIN LAYER TOPICALLY TO AFFECTED AREAS TWICE A DAY 30 g 0   • hydrochlorothiazide (MICROZIDE) 12.5 MG capsule TAKE ONE CAPSULE BY MOUTH EVERYDAY 90 Cap 1   • Multiple Vitamin (MULTI-VITAMINS) Tab TAKE ONE TABLET BY MOUTH ONCE DAILY 90 Tab 1   • aripiprazole (ABILIFY) 15 MG TABS Take 15 mg by mouth every morning.     • clotrimazole (LOTRIMIN) 1 % CREA Apply  to affected area(s) 2 times a day. As directed. 45 g 2     No current facility-administered medications for this visit.         Patient is taking medications as noted in medication list.  Current supplements as per medication list.     Allergies: Patient has no known allergies.    Current social contact/activities: [employer]     Is patient current with immunizations? Yes.    He  reports that he has been smoking Cigarettes.  He has been smoking about 0.50 packs per day.  He has never used smokeless tobacco. He reports that he does not drink alcohol or use drugs.  Ready to quit: Not Answered  Counseling given: Not Answered        DPA/Advanced directive: Patient does not have an Advanced Directive.  A packet and workshop information was given on Advanced Directives.    ROS:    Gait: Uses no assistive device   Ostomy: No   Other tubes: No   Amputations: No   Chronic oxygen use No   Last eye exam 05/18   Wears hearing aids: No   : Denies any urinary leakage during the last 6 months      Screening:    Depression Screening    Little interest or pleasure in doing things?  0  Feeling down, depressed, or hopeless? 0  Trouble falling or staying asleep, or sleeping too much?  0 - not at all  Feeling tired or having little energy?     Poor appetite or overeating?  0 - not at all  Feeling bad about yourself - or that you are a failure or have let yourself or your family down? 0 - not at all  Trouble concentrating on things, such as reading the newspaper or watching television? 0 - not at all  Moving or speaking so slowly that other people could have noticed.  Or the opposite - being so fidgety or restless that you have been moving around a lot more than usual?  0 - not at all  Thoughts that you would be better off dead, or of hurting yourself?  0 - not at all  Patient Health Questionnaire Score:  0      If depressive symptoms identified deferred to follow up visit unless specifically addressed in assessment and plan.    Interpretation of PHQ-9 Total Score   Score Severity   1-4 No Depression   5-9 Mild Depression   10-14 Moderate Depression   15-19 Moderately Severe Depression   20-27 Severe Depression      Screening for Cognitive Impairment    Three Minute Recall (village, kitchen, baby)  2/3 Village kitchen, baby  Draw clock face with all 12 numbers and set the hands to show 10 past 10.  Yes Time  10:10    5/5  If cognitive concerns identified, deferred for follow up unless specifically  addressed in assessment and plan.    Fall Risk Assessment    Has the patient had two or more falls in the last year or any fall with injury in the last year?  No  If fall risk identified, deferred for follow up unless specifically addressed in assessment and plan.      Safety Assessment    Throw rugs on floor.  No  Handrails on all stairs.  No  Good lighting in all hallways.  No  Difficulty hearing.  No  Patient counseled about all safety risks that were identified.    Functional Assessment ADLs    Are there any barriers preventing you from cooking for yourself or meeting nutritional needs?  No.    Are there any barriers preventing you from driving safely or obtaining transportation?  Yes. caregiver  Are there any barriers preventing you from using a telephone or calling for help?   .    Are there any barriers preventing you from shopping?  No.    Are there any barriers preventing you from taking care of your own finances?  Yes. Someone else pays bills  Are there any barriers preventing you from managing your medications?    Yes.    Are there any barriers preventing you from showering, bathing or dressing yourself?  No.    Are you currently engaging in any exercise or physical activity?  No.     What is your perception of your health?  Excellent.    Health Maintenance Summary                Annual Wellness Visit Overdue 2/27/2019      Done 2/26/2018 Visit Dx: Medicare annual wellness visit, subsequent     Patient has more history with this topic...    IMM DTaP/Tdap/Td Vaccine Next Due 5/13/2019      Done 5/13/2009 Imm Admin: Tdap Vaccine    IMM INFLUENZA Next Due 9/1/2019      Done 2/26/2018 Imm Admin: Influenza Vaccine Quad Inj (Pf)     Patient has more history with this topic...          Patient Care Team:  Jalyn Reaves M.D. as PCP - General  Dale Gay as Consulting Physician (Oncology)      Social History   Substance Use Topics   • Smoking status: Current Every Day Smoker     Packs/day: 0.50     Types:  "Cigarettes   • Smokeless tobacco: Never Used      Comment: started at age 21   • Alcohol use No     History reviewed. No pertinent family history.  He  has a past medical history of ADD (attention deficit disorder with hyperactivity); Conduct disorder, undifferentiated type; HTN (hypertension); Mental retardation; and Thrombocytopenia (HCC).   History reviewed. No pertinent surgical history.      Exam:     /82 (BP Location: Right arm, Patient Position: Sitting, BP Cuff Size: Adult)   Pulse 85   Temp 37.2 °C (99 °F) (Temporal)   Resp 16   Ht 1.803 m (5' 11\")   Wt 105.1 kg (231 lb 11.3 oz)   SpO2 93%  Body mass index is 32.32 kg/m².    Hearing good.    Dentition fair  Alert, oriented in no acute distress.  Eye contact is good, speech goal directed, affect calm  Skin:                 Warm, no rashes in visible areas    Head:               Atraumatic, normocephalic    Eyes:               Pupils equal, round and reactive to light, extraocular muscles movement                           intact, clear conjunctivae    Ears:               Tympanic membranes intact without evidence of infection, cerumen both ear canals more on the left than the right without complete blockage of the canals.    Nose:              No nasal discharge, no obstruction    Mouth:             No oral lesions    Throat:            Tonsils not enlarged, no exudates    Neck:              Trachea midline, supple, no lymphadenopathy, no thyromegaly    Lungs:             Clear to auscultation, no rales, no wheezes, no rhonchi    Heart:              Regular rate and rhythm, no murmur    Abdomen:       Good bowel sounds, soft, nontender, no hepatosplenomegaly, no masses    Extremities:    No edema, no clubbing, no cyanosis    Psych:            Alert and oriented x3, normal affect    Neuro:            Cranial nerves intact, Motor strength 5/5 upper and lower extremities,                                 DTRs 2+, sensation intact to light touch, " negative Romberg    Assessment and Plan. The following treatment and monitoring plan is recommended:     1. Medicare annual wellness visit, subsequent  Patient did not get flu shot this last season advised to get it this coming fall.  Tdap was given in 5/09.    2. Essential hypertension  Controlled on hydrochlorothiazide.    3. Dyslipidemia  He is managing this with his own efforts.  Last lipid panel came back all at target in January 2019.  His main problem is low HDL and he was able to get it up from 35-42.    4. Idiopathic thrombocytopenic purpura (HCC)  This is a chronic problem with platelet count ranging from  7K.  The most recent platelet count was in January 2019 and was 103K.  We will continue to follow along with his hematologist.    5. Mental retardation  He is followed by his psychiatrist.  He is on Abilify.    6. Conduct disorder, undifferentiated type  He is followed by psychiatrist and he is on Abilify.    7. Attention deficit hyperactivity disorder (ADHD), unspecified ADHD type  She is followed by psychiatrist and is on Abilify.    8.  Obesity (BMI 30-39.9  Patient has gained significant amount of weight since the last visit.  He was given sheet to follow for 1800-calorie diet.  I will reevaluate in 3 months to see if he achieved weight loss.  Consider referral to dietitian depending on results.  Patient's body mass index is 32.32 kg/m². Exercise and nutrition counseling were performed at this visit.      Services suggested: No services needed at this time  Health Care Screening recommendations as per orders if indicated.  Referrals offered: PT/OT/Nutrition counseling/Behavioral Health/Smoking cessation as per orders if indicated.    Discussion today about general wellness and lifestyle habits:    · Prevent falls and reduce trip hazards; Cautioned about securing or removing rugs.  · Have a working fire alarm and carbon monoxide detector;   · Engage in regular physical activity and social  activities       Follow-up: No Follow-up on file.

## 2019-07-11 ENCOUNTER — TELEPHONE (OUTPATIENT)
Dept: MEDICAL GROUP | Facility: PHYSICIAN GROUP | Age: 49
End: 2019-07-11

## 2019-07-11 NOTE — TELEPHONE ENCOUNTER
ESTABLISHED PATIENT PRE-VISIT PLANNING     Patient was NOT contacted to complete PVP.     Note: Patient will not be contacted if there is no indication to call.     1.  Reviewed notes from the last few office visits within the medical group: Yes    2.  If any orders were placed at last visit or intended to be done for this visit (i.e. 6 mos follow-up), do we have Results/Consult Notes?        •  Labs - Labs were not ordered at last office visit.   Note: If patient appointment is for lab review and patient did not complete labs, check with provider if OK to reschedule patient until labs completed.       •  Imaging - Imaging was not ordered at last office visit.       •  Referrals - No referrals were ordered at last office visit.    3. Is this appointment scheduled as a Hospital Follow-Up? No    4.  Immunizations were updated in Epic using WebIZ?: Epic matches WebIZ       •  Web Iz Recommendations: MMR , VARICELLA (Chicken Pox)  and SHINGRIX (Shingles)    5.  Patient is due for the following Health Maintenance Topics:   Health Maintenance Due   Topic Date Due   • IMM DTaP/Tdap/Td Vaccine (2 - Td) 05/13/2019       - Patient has completed FLU, PNEUMOVAX (PPSV23) and TDAP Immunization(s) per WebIZ. Chart has been updated.    6. Orders for overdue Health Maintenance topics pended in Pre-Charting? NO    7.  AHA (MDX) form printed for Provider? No, already completed    8.  Patient was NOT informed to arrive 15 min prior to their scheduled appointment and bring in their medication bottles.

## 2019-07-18 ENCOUNTER — OFFICE VISIT (OUTPATIENT)
Dept: MEDICAL GROUP | Facility: PHYSICIAN GROUP | Age: 49
End: 2019-07-18
Payer: MEDICARE

## 2019-07-18 VITALS
HEIGHT: 71 IN | DIASTOLIC BLOOD PRESSURE: 80 MMHG | WEIGHT: 219.6 LBS | TEMPERATURE: 98.4 F | BODY MASS INDEX: 30.74 KG/M2 | SYSTOLIC BLOOD PRESSURE: 110 MMHG

## 2019-07-18 DIAGNOSIS — I10 ESSENTIAL HYPERTENSION: ICD-10-CM

## 2019-07-18 DIAGNOSIS — E66.9 OBESITY (BMI 30-39.9): ICD-10-CM

## 2019-07-18 PROCEDURE — 99214 OFFICE O/P EST MOD 30 MIN: CPT | Performed by: FAMILY MEDICINE

## 2019-07-18 NOTE — PROGRESS NOTES
"Subjective:      Juan J Montiel is a 49 y.o. male who presents with Weight Loss        HPI:  The patient is here for a 3 month follow up of his weight. He is accompanied by his caretaker as well as two of his roommates.     Obesity (BMI 30-39.9)  The patient has lost approximately 12 lbs since our last visit. This is a significant improvement for him. He states that he has eliminated snack foods from his diet and that he is no longer drinking soda. He has only been drinking water. Patient also mentions that he has been walking to the park near his home nightly for about one hour. He has a goal to continue to lose weight and weigh about 200 lbs.     Essential hypertension  For his hypertension, patient continues to take his HCTZ as prescribed. He denies any adverse effects with the medication. His blood pressure is well-controlled today at 110/80.     Dyslipidemia  Regarding his dyslipidemia, patient continues to manage this with his own efforts.  His main problem is low HDL. Last lipid panel came back all at target in January 2019 with HDL increased from 35-42.      Past medical history, past surgical history, family history reviewed-no changes    Social history reviewed-no changes    Allergies reviewed-no changes    Medications reviewed-no changes      ROS:  As per the HPI as shown above, the rest are negative.       Objective:     /80 (BP Location: Left arm, Patient Position: Sitting, BP Cuff Size: Adult)   Temp 36.9 °C (98.4 °F) (Temporal)   Ht 1.803 m (5' 11\")   Wt 99.6 kg (219 lb 9.6 oz)   BMI 30.63 kg/m²     Physical Exam  Examined alert, awake, oriented, not in distress    Neck-supple, no lymphadenopathy, no thyromegaly  Lungs-clear to auscultation, no rales, no wheezes  Heart-regular rate and rhythm, no murmur  Extremities-no edema, clubbing, cyanosis       Assessment/Plan:     1. Obesity (BMI 30-39.9)  Patient has lost approximately 12 lbs since our last visit 3 months ago. Patient has continued to " follow his 1800-calorie diet and has eliminated all snack foods and sugary drinks from his diet. He is to continue making healthy dietary choices and to maintain his daily walking routine.  I congratulated him for his efforts.  Advised him to keep up with the good work.  I will reevaluate in 3 months to see if he continues to lose weight towards our current goal of 200 lbs or less.  Patient's body mass index is 30.63 kg/m². Exercise and nutrition counseling were performed at this visit.    2. Essential hypertension  Chronic and well controlled on current medication regimen. Continue taking medications as prescribed.     3.  Low HDL  Advised to keep up with regular activities and daily exercises keep the HDL at 40 or higher.     Ashish REYNA (Scrsumeete), am scribing for, and in the presence of, Jalyn Reaves MD     Electronically signed by: Ashish Park (Scribe), 7/18/2019    Jalyn REYNA MD personally performed the services described in this documentation, as scribed by Ashish Park in my presence, and it is both accurate and complete.

## 2019-08-30 ENCOUNTER — TELEPHONE (OUTPATIENT)
Dept: MEDICAL GROUP | Facility: PHYSICIAN GROUP | Age: 49
End: 2019-08-30

## 2019-08-30 NOTE — TELEPHONE ENCOUNTER
VOICEMAIL  1. Caller Name: Rivka @ [facility] [place]                      Call Back Number: [phone]    2. Message: Rivka called from [facility] and asked for a referral for patient to see a therapist. Did not give a reason     Please advise

## 2019-09-03 NOTE — TELEPHONE ENCOUNTER
Please clarify if this is for a physical therapy referral.  If this is for counseling/behavioral therapy/psychotherapy, the referral should come from his psychiatrist.

## 2019-09-03 NOTE — TELEPHONE ENCOUNTER
The referral should come from patient's psychiatrist since they are managing his psychiatric problems.

## 2019-10-24 ENCOUNTER — APPOINTMENT (OUTPATIENT)
Dept: MEDICAL GROUP | Facility: PHYSICIAN GROUP | Age: 49
End: 2019-10-24
Payer: MEDICARE

## 2020-02-27 ENCOUNTER — PATIENT OUTREACH (OUTPATIENT)
Dept: HEALTH INFORMATION MANAGEMENT | Facility: OTHER | Age: 50
End: 2020-02-27

## 2020-05-27 ENCOUNTER — TELEPHONE (OUTPATIENT)
Dept: MEDICAL GROUP | Facility: PHYSICIAN GROUP | Age: 50
End: 2020-05-27

## 2020-05-27 RX ORDER — HYDROCHLOROTHIAZIDE 25 MG/1
TABLET ORAL
COMMUNITY
Start: 2015-09-28 | End: 2020-05-28

## 2020-05-27 RX ORDER — ARIPIPRAZOLE 10 MG/1
TABLET ORAL
COMMUNITY
Start: 2016-04-12 | End: 2020-05-28

## 2020-05-27 RX ORDER — ARIPIPRAZOLE 15 MG/1
TABLET ORAL
COMMUNITY
Start: 2015-09-28 | End: 2020-05-28

## 2020-05-27 NOTE — TELEPHONE ENCOUNTER
ESTABLISHED PATIENT PRE-VISIT PLANNING     Patient was NOT contacted to complete PVP.     Note: Patient will not be contacted if there is no indication to call.     1.  Reviewed notes from the last few office visits within the medical group: Yes    2.  If any orders were placed at last visit or intended to be done for this visit (i.e. 6 mos follow-up), do we have Results/Consult Notes?        •  Labs - Labs were not ordered at last office visit.   Note: If patient appointment is for lab review and patient did not complete labs, check with provider if OK to reschedule patient until labs completed.       •  Imaging - Imaging was not ordered at last office visit.       •  Referrals - No referrals were ordered at last office visit.    3. Is this appointment scheduled as a Hospital Follow-Up? No    4.  Immunizations were updated in Epic using WebIZ?: Epic matches WebIZ       •  Web Iz Recommendations: TDAP and SHINGRIX (Shingles)    5.  Patient is due for the following Health Maintenance Topics:   Health Maintenance Due   Topic Date Due   • IMM DTaP/Tdap/Td Vaccine (2 - Td) 05/13/2019   • COLONOSCOPY  02/27/2020   • IMM ZOSTER VACCINES (1 of 2) 02/27/2020   • Annual Wellness Visit  04/15/2020       - Patient already has appointment scheduled for Annual Wellness Visit (AWV) and Colonoscopy/FIT.    6. Orders for overdue Health Maintenance topics pended in Pre-Charting? N\A    7.  AHA (MDX) form printed for Provider? YES    8.  Patient was NOT informed to arrive 15 min prior to their scheduled appointment and bring in their medication bottles.

## 2020-05-28 ENCOUNTER — OFFICE VISIT (OUTPATIENT)
Dept: MEDICAL GROUP | Facility: PHYSICIAN GROUP | Age: 50
End: 2020-05-28
Payer: MEDICARE

## 2020-05-28 VITALS
HEART RATE: 100 BPM | HEIGHT: 71 IN | OXYGEN SATURATION: 92 % | TEMPERATURE: 98.2 F | WEIGHT: 223 LBS | SYSTOLIC BLOOD PRESSURE: 128 MMHG | BODY MASS INDEX: 31.22 KG/M2 | RESPIRATION RATE: 18 BRPM | DIASTOLIC BLOOD PRESSURE: 74 MMHG

## 2020-05-28 DIAGNOSIS — F91.8 CONDUCT DISORDER, UNDIFFERENTIATED TYPE: ICD-10-CM

## 2020-05-28 DIAGNOSIS — Z12.5 SCREENING FOR PROSTATE CANCER: ICD-10-CM

## 2020-05-28 DIAGNOSIS — I10 ESSENTIAL HYPERTENSION: ICD-10-CM

## 2020-05-28 DIAGNOSIS — Z00.00 MEDICARE ANNUAL WELLNESS VISIT, SUBSEQUENT: ICD-10-CM

## 2020-05-28 DIAGNOSIS — Z12.11 SCREEN FOR COLON CANCER: ICD-10-CM

## 2020-05-28 DIAGNOSIS — F79 INTELLECTUAL DISABILITY: ICD-10-CM

## 2020-05-28 DIAGNOSIS — H61.23 IMPACTED CERUMEN OF BOTH EARS: ICD-10-CM

## 2020-05-28 DIAGNOSIS — D69.3 IDIOPATHIC THROMBOCYTOPENIC PURPURA (HCC): ICD-10-CM

## 2020-05-28 DIAGNOSIS — E78.5 DYSLIPIDEMIA: ICD-10-CM

## 2020-05-28 PROCEDURE — 69210 REMOVE IMPACTED EAR WAX UNI: CPT | Performed by: FAMILY MEDICINE

## 2020-05-28 PROCEDURE — 8041 PR SCP AHA: Performed by: FAMILY MEDICINE

## 2020-05-28 PROCEDURE — G0439 PPPS, SUBSEQ VISIT: HCPCS | Performed by: FAMILY MEDICINE

## 2020-05-28 RX ORDER — PRAZOSIN HYDROCHLORIDE 2 MG/1
2 CAPSULE ORAL
COMMUNITY
Start: 2020-05-22 | End: 2021-05-06

## 2020-05-28 RX ORDER — OLANZAPINE 15 MG/1
15 TABLET ORAL
COMMUNITY
Start: 2020-05-20 | End: 2021-05-06

## 2020-05-28 RX ORDER — VENLAFAXINE 50 MG/1
50 TABLET ORAL DAILY
COMMUNITY
Start: 2020-05-22 | End: 2021-05-06

## 2020-05-28 RX ORDER — OLANZAPINE 10 MG/1
TABLET ORAL
COMMUNITY
Start: 2020-05-22 | End: 2020-05-28

## 2020-05-28 SDOH — HEALTH STABILITY: MENTAL HEALTH: HOW MANY STANDARD DRINKS CONTAINING ALCOHOL DO YOU HAVE ON A TYPICAL DAY?: 1 OR 2

## 2020-05-28 SDOH — HEALTH STABILITY: MENTAL HEALTH: HOW OFTEN DO YOU HAVE A DRINK CONTAINING ALCOHOL?: MONTHLY OR LESS

## 2020-05-28 ASSESSMENT — ENCOUNTER SYMPTOMS: GENERAL WELL-BEING: GOOD

## 2020-05-28 ASSESSMENT — ACTIVITIES OF DAILY LIVING (ADL): BATHING_REQUIRES_ASSISTANCE: 0

## 2020-05-28 ASSESSMENT — PATIENT HEALTH QUESTIONNAIRE - PHQ9: CLINICAL INTERPRETATION OF PHQ2 SCORE: 0

## 2020-05-28 ASSESSMENT — FIBROSIS 4 INDEX: FIB4 SCORE: 1.69

## 2020-05-28 NOTE — PROGRESS NOTES
Chief Complaint   Patient presents with   • Annual Wellness Visit         HPI:  Juan J Montiel is a 50 y.o. here for Medicare Annual Wellness Visit     Patient Active Problem List    Diagnosis Date Noted   • Obesity (BMI 30-39.9) 04/15/2019   • Essential hypertension 08/05/2015   • Idiopathic thrombocytopenic purpura (HCC) 08/05/2015   • Dyslipidemia 03/03/2014   • ADD (attention deficit disorder) 02/04/2014   • Mental retardation    • Conduct disorder, undifferentiated type        Current Outpatient Medications   Medication Sig Dispense Refill   • Multiple Vitamin (MULTI-VITAMINS) Tab TAKE ONE TABLET BY MOUTH ONCE DAILY 100 Tab 11   • Multiple Vitamins-Minerals (MULTI ADULT GUMMIES PO)      • olanzapine (ZYPREXA) 10 MG tablet      • olanzapine (ZYPREXA) 15 MG tablet      • prazosin (MINIPRESS) 2 MG Cap      • venlafaxine (EFFEXOR) 50 MG tablet      • ARIPiprazole (ABILIFY) 10 MG Tab ABILIFY 10 MG TABS     • ARIPiprazole (ABILIFY) 15 MG Tab ABILIFY 15 MG TABS     • hydroCHLOROthiazide (HYDRODIURIL) 25 MG Tab HYDROCHLOROTHIAZIDE 25 MG TABS     • hydrochlorothiazide (MICROZIDE) 12.5 MG capsule TAKE ONE CAPSULE BY MOUTH EVERYDAY 100 Cap 11   • aripiprazole (ABILIFY) 15 MG TABS Take 15 mg by mouth every morning.       No current facility-administered medications for this visit.             Current supplements as per medication list.       Allergies: Patient has no known allergies.    Current social contact/activities: Weekends goes to the [employer]      He  reports that he has been smoking cigarettes. He has been smoking about 0.50 packs per day. He has never used smokeless tobacco. He reports current alcohol use. He reports that he does not use drugs.  Ready to quit: Not Answered  Counseling given: Not Answered  Comment: started at age 21      DPA/Advanced Directive:  Patient does not have an Advanced Directive.  A packet and workshop information was given on Advanced Directives.    ROS:    Gait: Uses no assistive  device  Ostomy: No  Other tubes: No  Amputations: No  Chronic oxygen use: No  Last eye exam: 2020  Wears hearing aids: No   : Denies any urinary leakage during the last 6 months     Annual Health Assessment Questions:    1.  Are you currently engaging in any exercise or physical activity? Yes    2.  How would you describe your mood or emotional well-being today? good    3.  Have you had any falls in the last year? No    4.  Have you noticed any problems with your balance or had difficulty walking? No    5.  In the last six months have you experienced any leakage of urine? No    6. DPA/Advanced Directive: Patient does not have an Advanced Directive.  A packet and workshop information was given on Advanced Directives.    Screening:  Annual Exam   Depression Screening    Little interest or pleasure in doing things?  0 - not at all  Feeling down, depressed , or hopeless? 0 - not at all  Patient Health Questionnaire Score: 0     If depressive symptoms identified deferred to follow up visit unless specifically addressed in assessment and plan.    Interpretation of PHQ-9 Total Score   Score Severity   1-4 No Depression   5-9 Mild Depression   10-14 Moderate Depression   15-19 Moderately Severe Depression   20-27 Severe Depression    Screening for Cognitive Impairment    Three Minute Recall (village, kitchen, baby) 1/3    Sekou clock face with all 12 numbers and set the hands to show 10 past 10.  Yes    Cognitive concerns identified deferred for follow up unless specifically addressed in assessment and plan.    Fall Risk Assessment    Has the patient had two or more falls in the last year or any fall with injury in the last year?  No    Safety Assessment    Throw rugs on floor.  Yes  Handrails on all stairs.  Yes  Good lighting in all hallways.  Yes  Difficulty hearing.  No  Patient counseled about all safety risks that were identified.    Functional Assessment ADLs    Are there any barriers preventing you from cooking for  yourself or meeting nutritional needs?  No.    Are there any barriers preventing you from driving safely or obtaining transportation?  No.    Are there any barriers preventing you from using a telephone or calling for help?  No.    Are there any barriers preventing you from shopping?  Yes.    Are there any barriers preventing you from taking care of your own finances?  No.    Are there any barriers preventing you from managing your medications?  No.    Are there any barriers preventing you from showering, bathing or dressing yourself?  No.    Are you currently engaging in any exercise or physical activity?  Yes.  tv exercise   What is your perception of your health?  Good.      Health Maintenance Summary                COLONOSCOPY Overdue 2/27/2020     Annual Wellness Visit Overdue 4/15/2020      Done 4/15/2019 Visit Dx: Medicare annual wellness visit, subsequent     Patient has more history with this topic...    IMM ZOSTER VACCINES Postponed 10/27/2020 Originally 2/27/2020. System: vaccine not available, other system reasons    IMM DTaP/Tdap/Td Vaccine Postponed 5/27/2021 Originally 5/13/2019. Insurance/Financial     Done 5/13/2009 Imm Admin: Tdap Vaccine    IMM INFLUENZA Next Due 9/1/2020      Done 2/26/2018 Imm Admin: Influenza Vaccine Quad Inj (Pf)     Patient has more history with this topic...          Patient Care Team:  Jalyn Reaves M.D. as PCP - General  Dale Gay M.D. as Consulting Physician (Oncology)  Richard Aguilera Ass't as [facility]         Social History     Tobacco Use   • Smoking status: Current Every Day Smoker     Packs/day: 0.50     Types: Cigarettes   • Smokeless tobacco: Never Used   • Tobacco comment: started at age 21   Substance Use Topics   • Alcohol use: Yes     Alcohol/week: 0.0 oz     Frequency: Monthly or less     Drinks per session: 1 or 2   • Drug use: No     History reviewed. No pertinent family history.  He  has a past medical history of ADD  "(attention deficit disorder with hyperactivity), Conduct disorder, undifferentiated type, HTN (hypertension), Mental retardation, and Thrombocytopenia (HCC).   History reviewed. No pertinent surgical history.    Exam:   /74 (BP Location: Left arm, Patient Position: Sitting, BP Cuff Size: Adult)   Pulse 100   Temp 36.8 °C (98.2 °F) (Temporal)   Resp 18   Ht 1.803 m (5' 11\")   Wt 101.2 kg (223 lb)   SpO2 92%  Body mass index is 31.1 kg/m².    Hearing good.    Dentition fair  Alert, oriented in no acute distress.  Eye contact is good, speech goal directed, affect calm  Skin:                 Warm, no rashes in visible areas    Head:               Atraumatic, normocephalic    Eyes:               Pupils equal, round and reactive to light, extraocular muscles movement                           intact, clear conjunctivae    Ears:               Excessive cerumen both ear canals, tympanic membranes not visible.    Nose:              No nasal discharge, no obstruction    Mouth:             No oral lesions    Throat:            Tonsils not enlarged, no exudates    Neck:              Trachea midline, supple, no lymphadenopathy, no thyromegaly    Lungs:             Clear to auscultation, no rales, no wheezes, no rhonchi    Heart:              Regular rate and rhythm, no murmur    Abdomen:       Good bowel sounds, soft, nontender, no hepatosplenomegaly, no masses    Extremities:    No edema, no clubbing, no cyanosis    Psych:            Alert and oriented x3, normal affect    Neuro:            Cranial nerves intact, Motor strength 5/5 upper and lower extremities,                                 DTRs 2+, sensation intact to light touch, negative Romberg  External genitalia-grossly male, circumcised, testicles are descended, no masses, no tenderness, no inguinal hernia  Rectal exam- there is fecal soiling, tight sphincter tone, patient was very uncomfortable during the exam so I was unable to feel the prostate and so the " Md resident to come to evaluate. OR examining finger was withdrawn    Assessment and Plan. The following treatment and monitoring plan is recommended:      1. Medicare annual wellness visit, subsequent  No evidence of depression for screening, no fall risk, he was able to do clock 5/5, he was only able to remember 1 of 3 words but this could be related to his intellectual disability.  Up-to-date with all immunizations.  He just turned 50 so he needs screening for colonoscopy and referralto GI specialist.  We will send him for screening PSA.    2. Essential hypertension  Controlled on his medication which is hydrochlorothiazide.    3. Dyslipidemia  This is managed with his own efforts with the last lipid panel was all at target in January 2019 with improvement of HDL from 35-42.  We will do updated blood work.    4. Idiopathic thrombocytopenic purpura (HCC)  Stable mild thrombocytopenia ranging from 98-120k.  He needs an updated referral to his hematologist Dr. Gay and referral was placed.  We will do an updated CBC.    5. Conduct disorder, undifferentiated type  Patient is followed by his psychiatrist and he takes venlafaxine, prazosin, olanzapine good results.    6. Intellectual disability  Followed by psychiatrist on above-mentioned medications as per #5.  He lives independently in a group home and is managed by [facility].    7. Screening for prostate cancer  Patient just turned 50.  We will do screening PSA.    8. Screen for colon cancer  Patient just turned 50.  We will do screening colonoscopy.    9. BMI 31.0-31.9,adult  We discussed diet, exercise and weight loss.    10. Impacted cerumen of both ears  Cerumen removed both by scooping them out using cerumen curette and by irrigation with warm water with good success.  Reexamination showed intact tympanic membranes without evidence of infection.    Services suggested: No services needed at this time  Health Care Screening: Age-appropriate preventive services recommended by USPTF and ACIP  covered by Medicare were discussed today. Services ordered if indicated and agreed upon by the patient.  Referrals offered: Community-based lifestyle interventions to reduce health risks and promote self-management and wellness, fall prevention, nutrition, physical activity, tobacco-use cessation, weight loss, and mental health services as per orders if indicated.    Discussion today about general wellness and lifestyle habits:    · Prevent falls and reduce trip hazards; Cautioned about securing or removing rugs.  · Have a working fire alarm and carbon monoxide detector;   · Engage in regular physical activity and social activities     Follow-up: No follow-ups on file.     Follow-up in 6 months.      Please note that this dictation was created using voice recognition software. I have worked with consultants from the vendor as well as technical experts from [employer] to optimize the interface. I have made every reasonable attempt to correct obvious errors, but I expect that there are errors of grammar and possibly content I did not discover before finalizing the note.

## 2020-06-03 ENCOUNTER — HOSPITAL ENCOUNTER (OUTPATIENT)
Dept: LAB | Facility: MEDICAL CENTER | Age: 50
End: 2020-06-03
Attending: FAMILY MEDICINE
Payer: MEDICARE

## 2020-06-03 DIAGNOSIS — F91.8 CONDUCT DISORDER, UNDIFFERENTIATED TYPE: ICD-10-CM

## 2020-06-03 DIAGNOSIS — Z12.5 SCREENING FOR PROSTATE CANCER: ICD-10-CM

## 2020-06-03 DIAGNOSIS — I10 ESSENTIAL HYPERTENSION: ICD-10-CM

## 2020-06-03 DIAGNOSIS — E78.5 DYSLIPIDEMIA: ICD-10-CM

## 2020-06-03 DIAGNOSIS — D69.3 IDIOPATHIC THROMBOCYTOPENIC PURPURA (HCC): ICD-10-CM

## 2020-06-03 DIAGNOSIS — F79 INTELLECTUAL DISABILITY: ICD-10-CM

## 2020-06-03 LAB
ALBUMIN SERPL BCP-MCNC: 4.4 G/DL (ref 3.2–4.9)
ALBUMIN/GLOB SERPL: 1.7 G/DL
ALP SERPL-CCNC: 79 U/L (ref 30–99)
ALT SERPL-CCNC: 25 U/L (ref 2–50)
ANION GAP SERPL CALC-SCNC: 12 MMOL/L (ref 7–16)
AST SERPL-CCNC: 22 U/L (ref 12–45)
BASOPHILS # BLD AUTO: 0.6 % (ref 0–1.8)
BASOPHILS # BLD: 0.04 K/UL (ref 0–0.12)
BILIRUB SERPL-MCNC: 0.2 MG/DL (ref 0.1–1.5)
BUN SERPL-MCNC: 23 MG/DL (ref 8–22)
CALCIUM SERPL-MCNC: 9 MG/DL (ref 8.5–10.5)
CHLORIDE SERPL-SCNC: 106 MMOL/L (ref 96–112)
CHOLEST SERPL-MCNC: 165 MG/DL (ref 100–199)
CO2 SERPL-SCNC: 24 MMOL/L (ref 20–33)
CREAT SERPL-MCNC: 0.94 MG/DL (ref 0.5–1.4)
EOSINOPHIL # BLD AUTO: 0.32 K/UL (ref 0–0.51)
EOSINOPHIL NFR BLD: 4.6 % (ref 0–6.9)
ERYTHROCYTE [DISTWIDTH] IN BLOOD BY AUTOMATED COUNT: 40.9 FL (ref 35.9–50)
FASTING STATUS PATIENT QL REPORTED: NORMAL
GLOBULIN SER CALC-MCNC: 2.6 G/DL (ref 1.9–3.5)
GLUCOSE SERPL-MCNC: 91 MG/DL (ref 65–99)
HCT VFR BLD AUTO: 45.7 % (ref 42–52)
HDLC SERPL-MCNC: 42 MG/DL
HGB BLD-MCNC: 15.6 G/DL (ref 14–18)
IMM GRANULOCYTES # BLD AUTO: 0.06 K/UL (ref 0–0.11)
IMM GRANULOCYTES NFR BLD AUTO: 0.9 % (ref 0–0.9)
LDLC SERPL CALC-MCNC: 97 MG/DL
LYMPHOCYTES # BLD AUTO: 1.62 K/UL (ref 1–4.8)
LYMPHOCYTES NFR BLD: 23.3 % (ref 22–41)
MCH RBC QN AUTO: 31 PG (ref 27–33)
MCHC RBC AUTO-ENTMCNC: 34.1 G/DL (ref 33.7–35.3)
MCV RBC AUTO: 90.9 FL (ref 81.4–97.8)
MONOCYTES # BLD AUTO: 0.51 K/UL (ref 0–0.85)
MONOCYTES NFR BLD AUTO: 7.3 % (ref 0–13.4)
NEUTROPHILS # BLD AUTO: 4.39 K/UL (ref 1.82–7.42)
NEUTROPHILS NFR BLD: 63.3 % (ref 44–72)
NRBC # BLD AUTO: 0 K/UL
NRBC BLD-RTO: 0 /100 WBC
PLATELET # BLD AUTO: 126 K/UL (ref 164–446)
PMV BLD AUTO: 11.1 FL (ref 9–12.9)
POTASSIUM SERPL-SCNC: 4.5 MMOL/L (ref 3.6–5.5)
PROT SERPL-MCNC: 7 G/DL (ref 6–8.2)
RBC # BLD AUTO: 5.03 M/UL (ref 4.7–6.1)
SODIUM SERPL-SCNC: 142 MMOL/L (ref 135–145)
TRIGL SERPL-MCNC: 131 MG/DL (ref 0–149)
WBC # BLD AUTO: 6.9 K/UL (ref 4.8–10.8)

## 2020-06-03 PROCEDURE — 85025 COMPLETE CBC W/AUTO DIFF WBC: CPT

## 2020-06-03 PROCEDURE — 80053 COMPREHEN METABOLIC PANEL: CPT

## 2020-06-03 PROCEDURE — 84153 ASSAY OF PSA TOTAL: CPT

## 2020-06-03 PROCEDURE — 80061 LIPID PANEL: CPT

## 2020-06-03 PROCEDURE — 36415 COLL VENOUS BLD VENIPUNCTURE: CPT

## 2020-06-04 ENCOUNTER — TELEPHONE (OUTPATIENT)
Dept: MEDICAL GROUP | Facility: MEDICAL CENTER | Age: 50
End: 2020-06-04

## 2020-06-04 LAB — PSA SERPL-MCNC: 0.81 NG/ML (ref 0–4)

## 2020-06-04 NOTE — TELEPHONE ENCOUNTER
Phone Number Called: [phone] (home)       Call outcome: Spoke with Tena    Message: Dr Jelly MONTES DE OCA already reviewed with HealthCare worker.

## 2020-06-04 NOTE — TELEPHONE ENCOUNTER
----- Message from Jalyn Reaves M.D. sent at 6/4/2020  6:37 AM PDT -----  PSA normal.  No diabetes.  Liver and kidney functions normal.  Mild dehydration with mild elevation of BUN.  Increase water intake.  Cholesterol panel all very good and at target.  No anemia.  Platelet count still slightly low at 126,000 but stable.

## 2020-07-07 RX ORDER — HYDROCHLOROTHIAZIDE 12.5 MG/1
CAPSULE, GELATIN COATED ORAL
Qty: 28 CAP | Refills: 10 | Status: SHIPPED | OUTPATIENT
Start: 2020-07-07 | End: 2021-05-11

## 2020-07-07 RX ORDER — DIPHENOXYLATE HYDROCHLORIDE AND ATROPINE SULFATE 2.5; .025 MG/1; MG/1
TABLET ORAL
Qty: 28 TAB | Refills: 10 | Status: SHIPPED | OUTPATIENT
Start: 2020-07-07 | End: 2021-05-11

## 2020-07-31 ENCOUNTER — HOSPITAL ENCOUNTER (OUTPATIENT)
Dept: RADIOLOGY | Facility: MEDICAL CENTER | Age: 50
End: 2020-07-31
Attending: INTERNAL MEDICINE
Payer: MEDICARE

## 2020-07-31 DIAGNOSIS — D69.6 THROMBOCYTOPENIA, UNSPECIFIED (HCC): ICD-10-CM

## 2020-07-31 PROCEDURE — 76700 US EXAM ABDOM COMPLETE: CPT

## 2020-09-08 ENCOUNTER — HOSPITAL ENCOUNTER (OUTPATIENT)
Dept: LAB | Facility: MEDICAL CENTER | Age: 50
End: 2020-09-08
Attending: INTERNAL MEDICINE
Payer: MEDICARE

## 2020-09-08 LAB
ALBUMIN SERPL BCP-MCNC: 4.6 G/DL (ref 3.2–4.9)
ALBUMIN/GLOB SERPL: 2 G/DL
ALP SERPL-CCNC: 86 U/L (ref 30–99)
ALT SERPL-CCNC: 30 U/L (ref 2–50)
ANION GAP SERPL CALC-SCNC: 14 MMOL/L (ref 7–16)
AST SERPL-CCNC: 18 U/L (ref 12–45)
BASOPHILS # BLD AUTO: 0.6 % (ref 0–1.8)
BASOPHILS # BLD: 0.05 K/UL (ref 0–0.12)
BILIRUB SERPL-MCNC: 0.4 MG/DL (ref 0.1–1.5)
BUN SERPL-MCNC: 23 MG/DL (ref 8–22)
CALCIUM SERPL-MCNC: 9.3 MG/DL (ref 8.5–10.5)
CHLORIDE SERPL-SCNC: 104 MMOL/L (ref 96–112)
CO2 SERPL-SCNC: 24 MMOL/L (ref 20–33)
CREAT SERPL-MCNC: 0.84 MG/DL (ref 0.5–1.4)
EOSINOPHIL # BLD AUTO: 0.26 K/UL (ref 0–0.51)
EOSINOPHIL NFR BLD: 3.3 % (ref 0–6.9)
ERYTHROCYTE [DISTWIDTH] IN BLOOD BY AUTOMATED COUNT: 42.7 FL (ref 35.9–50)
FASTING STATUS PATIENT QL REPORTED: NORMAL
FOLATE SERPL-MCNC: 22 NG/ML
GLOBULIN SER CALC-MCNC: 2.3 G/DL (ref 1.9–3.5)
GLUCOSE SERPL-MCNC: 87 MG/DL (ref 65–99)
HCT VFR BLD AUTO: 48.8 % (ref 42–52)
HGB BLD-MCNC: 16.1 G/DL (ref 14–18)
IMM GRANULOCYTES # BLD AUTO: 0.05 K/UL (ref 0–0.11)
IMM GRANULOCYTES NFR BLD AUTO: 0.6 % (ref 0–0.9)
LYMPHOCYTES # BLD AUTO: 2.07 K/UL (ref 1–4.8)
LYMPHOCYTES NFR BLD: 26.4 % (ref 22–41)
MCH RBC QN AUTO: 31 PG (ref 27–33)
MCHC RBC AUTO-ENTMCNC: 33 G/DL (ref 33.7–35.3)
MCV RBC AUTO: 93.8 FL (ref 81.4–97.8)
MONOCYTES # BLD AUTO: 0.59 K/UL (ref 0–0.85)
MONOCYTES NFR BLD AUTO: 7.5 % (ref 0–13.4)
NEUTROPHILS # BLD AUTO: 4.81 K/UL (ref 1.82–7.42)
NEUTROPHILS NFR BLD: 61.6 % (ref 44–72)
NRBC # BLD AUTO: 0 K/UL
NRBC BLD-RTO: 0 /100 WBC
PLATELET # BLD AUTO: 133 K/UL (ref 164–446)
PMV BLD AUTO: 11.5 FL (ref 9–12.9)
POTASSIUM SERPL-SCNC: 4.1 MMOL/L (ref 3.6–5.5)
PROT SERPL-MCNC: 6.9 G/DL (ref 6–8.2)
RBC # BLD AUTO: 5.2 M/UL (ref 4.7–6.1)
SODIUM SERPL-SCNC: 142 MMOL/L (ref 135–145)
VIT B12 SERPL-MCNC: 807 PG/ML (ref 211–911)
WBC # BLD AUTO: 7.8 K/UL (ref 4.8–10.8)

## 2020-09-08 PROCEDURE — 80053 COMPREHEN METABOLIC PANEL: CPT

## 2020-09-08 PROCEDURE — 85025 COMPLETE CBC W/AUTO DIFF WBC: CPT

## 2020-09-08 PROCEDURE — 36415 COLL VENOUS BLD VENIPUNCTURE: CPT

## 2020-09-08 PROCEDURE — 82746 ASSAY OF FOLIC ACID SERUM: CPT

## 2020-09-08 PROCEDURE — 82607 VITAMIN B-12: CPT

## 2020-12-01 ENCOUNTER — TELEPHONE (OUTPATIENT)
Dept: MEDICAL GROUP | Facility: PHYSICIAN GROUP | Age: 50
End: 2020-12-01

## 2020-12-01 NOTE — TELEPHONE ENCOUNTER
ESTABLISHED PATIENT PRE-VISIT PLANNING     Patient was NOT contacted to complete PVP.     Note: Patient will not be contacted if there is no indication to call.     1.  Reviewed notes from the last few office visits within the medical group: Yes    2.  If any orders were placed at last visit or intended to be done for this visit (i.e. 6 mos follow-up), do we have Results/Consult Notes?        •  Labs - Labs ordered, completed on 06/03/ and results are in chart.   Note: If patient appointment is for lab review and patient did not complete labs, check with provider if OK to reschedule patient until labs completed.       •  Imaging - Imaging ordered, completed and results are in chart.       •  Referrals - Referral ordered, patient has NOT been seen.    3. Is this appointment scheduled as a Hospital Follow-Up? No    4.  Immunizations were updated in Epic using WebIZ?: Epic matches WebIZ       •  Web Iz Recommendations: FLU and SHINGRIX (Shingles)    5.  Patient is due for the following Health Maintenance Topics:   Health Maintenance Due   Topic Date Due   • IMM ZOSTER VACCINES (1 of 2) 02/27/2020   • IMM INFLUENZA (1) 09/01/2020       - Patient plans to schedule appointment for Annual Wellness Visit (AWV).    6. Orders for overdue Health Maintenance topics pended in Pre-Charting? N\A    7.  AHA (MDX) form printed for Provider? No, already completed    8.  Patient was NOT informed to arrive 15 min prior to their scheduled appointment and bring in their medication bottles.

## 2020-12-08 ENCOUNTER — OFFICE VISIT (OUTPATIENT)
Dept: URGENT CARE | Facility: CLINIC | Age: 50
End: 2020-12-08
Payer: MEDICARE

## 2020-12-08 ENCOUNTER — HOSPITAL ENCOUNTER (OUTPATIENT)
Facility: MEDICAL CENTER | Age: 50
End: 2020-12-08
Attending: PHYSICIAN ASSISTANT
Payer: MEDICARE

## 2020-12-08 ENCOUNTER — APPOINTMENT (OUTPATIENT)
Dept: RADIOLOGY | Facility: IMAGING CENTER | Age: 50
End: 2020-12-08
Attending: PHYSICIAN ASSISTANT
Payer: MEDICARE

## 2020-12-08 VITALS
BODY MASS INDEX: 29.82 KG/M2 | DIASTOLIC BLOOD PRESSURE: 74 MMHG | WEIGHT: 225 LBS | RESPIRATION RATE: 16 BRPM | OXYGEN SATURATION: 95 % | HEART RATE: 81 BPM | TEMPERATURE: 98.1 F | HEIGHT: 73 IN | SYSTOLIC BLOOD PRESSURE: 112 MMHG

## 2020-12-08 DIAGNOSIS — R05.9 COUGH: ICD-10-CM

## 2020-12-08 DIAGNOSIS — J98.8 RTI (RESPIRATORY TRACT INFECTION): ICD-10-CM

## 2020-12-08 PROCEDURE — U0003 INFECTIOUS AGENT DETECTION BY NUCLEIC ACID (DNA OR RNA); SEVERE ACUTE RESPIRATORY SYNDROME CORONAVIRUS 2 (SARS-COV-2) (CORONAVIRUS DISEASE [COVID-19]), AMPLIFIED PROBE TECHNIQUE, MAKING USE OF HIGH THROUGHPUT TECHNOLOGIES AS DESCRIBED BY CMS-2020-01-R: HCPCS

## 2020-12-08 PROCEDURE — 99214 OFFICE O/P EST MOD 30 MIN: CPT | Performed by: PHYSICIAN ASSISTANT

## 2020-12-08 PROCEDURE — 71046 X-RAY EXAM CHEST 2 VIEWS: CPT | Mod: TC | Performed by: PHYSICIAN ASSISTANT

## 2020-12-08 ASSESSMENT — ENCOUNTER SYMPTOMS
COUGH: 1
VOMITING: 0
HEMOPTYSIS: 0
WHEEZING: 0
MYALGIAS: 0
FEVER: 0
CHILLS: 0
SHORTNESS OF BREATH: 0
SPUTUM PRODUCTION: 0
NAUSEA: 0
SORE THROAT: 1
FOCAL WEAKNESS: 0
RHINORRHEA: 0
SINUS PAIN: 0
TINGLING: 0
PALPITATIONS: 0
SWEATS: 0
ABDOMINAL PAIN: 0
SENSORY CHANGE: 0
HEADACHES: 1
DIARRHEA: 0

## 2020-12-08 ASSESSMENT — FIBROSIS 4 INDEX: FIB4 SCORE: 1.24

## 2020-12-08 ASSESSMENT — COPD QUESTIONNAIRES: COPD: 0

## 2020-12-08 NOTE — PROGRESS NOTES
Subjective:      Juan J Montiel is a 50 y.o. male who presents with Coronavirus Screening (headache, sorethroat, upset stomach, fatigue, chronic cough xcouple years)            Cough  This is a new (chronic cough but worse than usual x 2 weeks ) problem. The current episode started 1 to 4 weeks ago. The problem has been unchanged. The cough is non-productive. Associated symptoms include headaches, nasal congestion and a sore throat. Pertinent negatives include no chest pain, chills, ear pain, fever, hemoptysis, myalgias, postnasal drip, rash, rhinorrhea, shortness of breath, sweats or wheezing. Nothing aggravates the symptoms. Risk factors for lung disease include smoking/tobacco exposure. He has tried nothing for the symptoms. There is no history of asthma, bronchitis, COPD or pneumonia.     The patient lives in a group home. The staff that is with him is concerned about possible COVID.  He smokes 13 cigarettes per day.   One of the staff members at his group home did test positive for COVID.     Past Medical History:   Diagnosis Date   • ADD (attention deficit disorder with hyperactivity)    • Conduct disorder, undifferentiated type    • HTN (hypertension)    • Mental retardation     mild   • Thrombocytopenia (HCC)     followed by hematologist       No past surgical history on file.    No family history on file.    No Known Allergies    Medications, Allergies, and current problem list reviewed today in Epic      Review of Systems   Constitutional: Positive for malaise/fatigue. Negative for chills and fever.   HENT: Positive for congestion and sore throat. Negative for ear discharge, ear pain, postnasal drip, rhinorrhea and sinus pain.    Respiratory: Positive for cough. Negative for hemoptysis, sputum production, shortness of breath and wheezing.    Cardiovascular: Negative for chest pain, palpitations and leg swelling.   Gastrointestinal: Negative for abdominal pain, diarrhea, nausea and vomiting.   Musculoskeletal:  "Negative for myalgias.   Skin: Negative for rash.   Neurological: Positive for headaches. Negative for tingling, sensory change and focal weakness.     All other systems reviewed and are negative.        Objective:     /74   Pulse 81   Temp 36.7 °C (98.1 °F) (Temporal)   Resp 16   Ht 1.854 m (6' 1\")   Wt 102.1 kg (225 lb)   SpO2 95%   BMI 29.69 kg/m²      Physical Exam  Constitutional:       General: He is not in acute distress.     Appearance: He is not ill-appearing.   HENT:      Head: Normocephalic and atraumatic.      Right Ear: External ear normal.      Left Ear: External ear normal.      Nose: Nose normal.   Eyes:      Conjunctiva/sclera: Conjunctivae normal.   Cardiovascular:      Rate and Rhythm: Normal rate and regular rhythm.      Heart sounds: Normal heart sounds. No murmur. No friction rub. No gallop.    Pulmonary:      Effort: Pulmonary effort is normal. No respiratory distress.      Breath sounds: Normal breath sounds. No wheezing, rhonchi or rales.   Musculoskeletal: Normal range of motion.   Skin:     General: Skin is warm and dry.      Findings: No rash.   Neurological:      General: No focal deficit present.      Mental Status: He is alert and oriented to person, place, and time.   Psychiatric:         Mood and Affect: Mood normal.         Behavior: Behavior normal.         Thought Content: Thought content normal.         Judgment: Judgment normal.           12/8/2020 3:34 PM     HISTORY/REASON FOR EXAM:  Cough        TECHNIQUE/EXAM DESCRIPTION AND NUMBER OF VIEWS:  Two views of the chest.     COMPARISON:  CT 9/14/2010     FINDINGS:     The cardiac silhouette  and mediastinal contours are normal.     No discrete opacity, pleural fluid or pneumothorax.     No suspicious bony lesions.           IMPRESSION:     No acute cardiopulmonary findings.       Assessment/Plan:        1. Cough  DX-CHEST-2 VIEWS    COVID/SARS COV-2 PCR     Suspect Viral RTI  COVID test pending.   Isolation " guidelines, conservative measures and ER precautions discussed.     COVID handout given to Caregiver.     Differential diagnoses, Supportive care, and indications for immediate follow-up discussed with patient and caregiver.   Pathogenesis of diagnosis discussed including typical length and natural progression.   Instructed to return to clinic or nearest emergency department for any change in condition, further concerns, or worsening of symptoms.    The patient  And his caregiver demonstrated a good understanding and agreed with the treatment plan.    India Torres P.A.-C.

## 2020-12-09 DIAGNOSIS — J98.8 RTI (RESPIRATORY TRACT INFECTION): ICD-10-CM

## 2020-12-09 DIAGNOSIS — R05.9 COUGH: ICD-10-CM

## 2020-12-09 LAB — COVID ORDER STATUS COVID19: NORMAL

## 2020-12-10 LAB
SARS-COV-2 RNA RESP QL NAA+PROBE: NOTDETECTED
SPECIMEN SOURCE: NORMAL

## 2021-01-22 ENCOUNTER — TELEPHONE (OUTPATIENT)
Dept: MEDICAL GROUP | Facility: PHYSICIAN GROUP | Age: 51
End: 2021-01-22

## 2021-01-22 NOTE — TELEPHONE ENCOUNTER
VOICEMAIL  1. Caller Name: Denise @ [facility]                      Call Back Number: [phone]    2. Message: Denise called requesting a referral for patient for talk therapy be pllaced and faxed to [phone]    [facility] on [phone] requesting why patient needs referral

## 2021-01-24 NOTE — TELEPHONE ENCOUNTER
Please check with Denise the reason for the referral and where she wants us to send the referral (if there is a particular therapist she prefers).

## 2021-01-25 DIAGNOSIS — F91.8 CONDUCT DISORDER, UNDIFFERENTIATED TYPE: ICD-10-CM

## 2021-01-25 NOTE — TELEPHONE ENCOUNTER
Phone Number Called: [phone]    Call outcome: Spoke to Denise    Message: Patient has recently started lying, stealing and punching walls. Patient states he is doing this because of his dad dying roughly 5 years ago. She does not have any specific therapist she prefers. Just needs referral faxed to [phone] once approved

## 2021-04-29 ENCOUNTER — TELEPHONE (OUTPATIENT)
Dept: MEDICAL GROUP | Facility: PHYSICIAN GROUP | Age: 51
End: 2021-04-29

## 2021-04-29 NOTE — TELEPHONE ENCOUNTER
ESTABLISHED PATIENT PRE-VISIT PLANNING     Patient was NOT contacted to complete PVP.     Note: Patient will not be contacted if there is no indication to call.     1.  Reviewed notes from the last few office visits within the medical group: Yes    2.  If any orders were placed at last visit or intended to be done for this visit (i.e. 6 mos follow-up), do we have Results/Consult Notes?         •  Labs - Labs were not ordered at last office visit.  Note: If patient appointment is for lab review and patient did not complete labs, check with provider if OK to reschedule patient until labs completed.       •  Imaging - Imaging was not ordered at last office visit.       •  Referrals - No referrals were ordered at last office visit.    3. Is this appointment scheduled as a Hospital Follow-Up? No    4.  Immunizations were updated in Epic using Reconcile Outside Information activity? Yes    5.  Patient is due for the following Health Maintenance Topics:   Health Maintenance Due   Topic Date Due   • IMM ZOSTER VACCINES (1 of 2) Never done       6.  AHA (Pulse8) form printed for Provider? Yes

## 2021-05-05 ENCOUNTER — OFFICE VISIT (OUTPATIENT)
Dept: MEDICAL GROUP | Facility: PHYSICIAN GROUP | Age: 51
End: 2021-05-05
Payer: MEDICARE

## 2021-05-05 VITALS
TEMPERATURE: 97.6 F | SYSTOLIC BLOOD PRESSURE: 90 MMHG | HEART RATE: 89 BPM | OXYGEN SATURATION: 94 % | HEIGHT: 73 IN | WEIGHT: 234.35 LBS | DIASTOLIC BLOOD PRESSURE: 60 MMHG | BODY MASS INDEX: 31.06 KG/M2

## 2021-05-05 DIAGNOSIS — Z23 NEED FOR SHINGLES VACCINE: ICD-10-CM

## 2021-05-05 DIAGNOSIS — F79 INTELLECTUAL DISABILITY: ICD-10-CM

## 2021-05-05 DIAGNOSIS — F91.8 CONDUCT DISORDER, UNDIFFERENTIATED TYPE: ICD-10-CM

## 2021-05-05 DIAGNOSIS — Z00.00 MEDICARE ANNUAL WELLNESS VISIT, SUBSEQUENT: ICD-10-CM

## 2021-05-05 DIAGNOSIS — D69.3 IDIOPATHIC THROMBOCYTOPENIC PURPURA (HCC): ICD-10-CM

## 2021-05-05 DIAGNOSIS — E78.5 DYSLIPIDEMIA: ICD-10-CM

## 2021-05-05 DIAGNOSIS — I10 ESSENTIAL HYPERTENSION: ICD-10-CM

## 2021-05-05 DIAGNOSIS — Z12.5 SCREENING FOR PROSTATE CANCER: ICD-10-CM

## 2021-05-05 PROCEDURE — G0439 PPPS, SUBSEQ VISIT: HCPCS | Performed by: FAMILY MEDICINE

## 2021-05-05 PROCEDURE — 8041 PR SCP AHA: Performed by: FAMILY MEDICINE

## 2021-05-05 PROCEDURE — 90750 HZV VACC RECOMBINANT IM: CPT | Performed by: FAMILY MEDICINE

## 2021-05-05 PROCEDURE — 90471 IMMUNIZATION ADMIN: CPT | Performed by: FAMILY MEDICINE

## 2021-05-05 ASSESSMENT — PATIENT HEALTH QUESTIONNAIRE - PHQ9: CLINICAL INTERPRETATION OF PHQ2 SCORE: 0

## 2021-05-05 ASSESSMENT — FIBROSIS 4 INDEX: FIB4 SCORE: 1.26

## 2021-05-05 ASSESSMENT — ACTIVITIES OF DAILY LIVING (ADL): BATHING_REQUIRES_ASSISTANCE: 0

## 2021-05-05 ASSESSMENT — ENCOUNTER SYMPTOMS: GENERAL WELL-BEING: GOOD

## 2021-05-05 NOTE — PROGRESS NOTES
Chief Complaint   Patient presents with   • Annual Exam     AWV         HPI:  Juan J is a 51 y.o. here for Medicare Annual Wellness Visit and for [facility].        Patient Active Problem List    Diagnosis Date Noted   • Obesity (BMI 30-39.9) 04/15/2019   • Essential hypertension 08/05/2015   • Idiopathic thrombocytopenic purpura (HCC) 08/05/2015   • Dyslipidemia 03/03/2014   • ADD (attention deficit disorder) 02/04/2014   • Intellectual disability    • Conduct disorder, undifferentiated type        Current Outpatient Medications   Medication Sig Dispense Refill   • hydrochlorothiazide (MICROZIDE) 12.5 MG capsule TAKE ONE CAPSULE BY MOUTH EVERYDAY 28 Cap 10   • Multiple Vitamin (MULTI-VITAMINS) Tab TAKE ONE TABLET BY MOUTH ONCE DAILY 28 Tab 10   • olanzapine (ZYPREXA) 15 MG tablet Take 15 mg by mouth every bedtime.     • prazosin (MINIPRESS) 2 MG Cap Take 2 mg by mouth every bedtime.     • venlafaxine (EFFEXOR) 50 MG tablet Take 50 mg by mouth every day.       No current facility-administered medications for this visit.        Patient is taking medications as noted in medication list.  Current supplements as per medication list.     Allergies: Patient has no known allergies.    Current social contact/activities:  charlene    Is patient current with immunizations? No, due for Patient is up to date on all vaccines. Patient is interested in receiving SHINGRIX (Shingles) today.    He  reports that he has been smoking cigarettes. He has been smoking about 1.00 pack per day. He has never used smokeless tobacco. He reports current alcohol use. He reports that he does not use drugs.  Ready to quit: Not Answered  Counseling given: Not Answered  Comment: started at age 21        DPA/Advanced directive: Patient does not have an Advanced Directive.  A packet and workshop information was given on Advanced Directives.    ROS:    Gait: Uses no assistive device   Ostomy: No   Other tubes: No   Amputations: No   Chronic oxygen use No   Last  eye exam 2020   Wears hearing aids: No   : Denies any urinary leakage during the last 6 months      Screening:        Depression Screening    Little interest or pleasure in doing things?  0 - not at all  Feeling down, depressed, or hopeless? 0 - not at all  Patient Health Questionnaire Score: 0    If depressive symptoms identified deferred to follow up visit unless specifically addressed in assessment and plan.    Interpretation of PHQ-9 Total Score   Score Severity   1-4 No Depression   5-9 Mild Depression   10-14 Moderate Depression   15-19 Moderately Severe Depression   20-27 Severe Depression    Screening for Cognitive Impairment    Three Minute Recall (captain, garden, picture)  3/3    Sekou clock face with all 12 numbers and set the hands to show 5 past 8.  Yes    If cognitive concerns identified, deferred for follow up unless specifically addressed in assessment and plan.    Fall Risk Assessment    Has the patient had two or more falls in the last year or any fall with injury in the last year?  No  If fall risk identified, deferred for follow up unless specifically addressed in assessment and plan.    Safety Assessment    Throw rugs on floor.  No  Handrails on all stairs.  No  Good lighting in all hallways.  No  Difficulty hearing.  No  Patient counseled about all safety risks that were identified.    Functional Assessment ADLs    Are there any barriers preventing you from cooking for yourself or meeting nutritional needs?  No.    Are there any barriers preventing you from driving safely or obtaining transportation?  No.    Are there any barriers preventing you from using a telephone or calling for help?  No.    Are there any barriers preventing you from shopping?  No.    Are there any barriers preventing you from taking care of your own finances?  No.    Are there any barriers preventing you from managing your medications?  No.    Are there any barriers preventing you from showering, bathing or dressing  "yourself?  No.    Are you currently engaging in any exercise or physical activity?  Yes.     What is your perception of your health?  Good.    Health Maintenance Summary                IMM ZOSTER VACCINES Overdue 2/27/2020     IMM DTaP/Tdap/Td Vaccine Postponed 5/27/2021 Originally 5/13/2019. Insurance/Financial     Done 5/13/2009 Imm Admin: Tdap Vaccine    Annual Wellness Visit Next Due 5/29/2021      Done 5/28/2020 Visit Dx: Medicare annual wellness visit, subsequent     Patient has more history with this topic...    IMM INFLUENZA Next Due 9/1/2021      Done 2/26/2018 Imm Admin: Influenza Vaccine Quad Inj (Pf)     Patient has more history with this topic...    COLONOSCOPY Next Due 8/21/2027      Done 8/21/2020 REFERRAL TO GI FOR COLONOSCOPY          Patient Care Team:  Jalyn Reaves M.D. as PCP - General  Dale Gay M.D. as Consulting Physician (Oncology)  Richard Aguilera Ass't as [facility]     Social History     Tobacco Use   • Smoking status: Current Every Day Smoker     Packs/day: 1.00     Types: Cigarettes   • Smokeless tobacco: Never Used   • Tobacco comment: started at age 21   Substance Use Topics   • Alcohol use: Yes     Alcohol/week: 0.0 oz   • Drug use: No     History reviewed. No pertinent family history.  He  has a past medical history of ADD (attention deficit disorder with hyperactivity), Conduct disorder, undifferentiated type, HTN (hypertension), Mental retardation, and Thrombocytopenia (HCC).   History reviewed. No pertinent surgical history.        Exam:     BP (!) 90/60 (BP Location: Left arm, Patient Position: Sitting, BP Cuff Size: Adult)   Pulse 89   Temp 36.4 °C (97.6 °F) (Temporal)   Ht 1.854 m (6' 1\")   Wt 106 kg (234 lb 5.6 oz)   SpO2 94%  Body mass index is 30.92 kg/m².    Hearing good.    Dentition good  Alert, oriented in no acute distress.  Eye contact is good, speech goal directed, affect calm, l    Skin:                 Warm, no rashes in visible " areas    Head:               Atraumatic, normocephalic    Eyes:               Pupils equal, round and reactive to light, extraocular muscles movement intact, clear conjunctivae    Ears:               Tympanic membranes intact without evidence of infection    Nose:              No nasal discharge, no obstruction    Mouth:             No oral lesions    Throat:            Tonsils not enlarged, no exudates    Neck:              Trachea midline, supple, no lymphadenopathy, no thyromegaly    Lungs:             Clear to auscultation, no rales, no wheezes, no rhonchi    Heart:              Regular rate and rhythm, no murmur    Abdomen:       Good bowel sounds, soft, nontender, no hepatosplenomegaly, no masses    Extremities:    No edema, no clubbing, no cyanosis    External genitalia: Circumcised, testicles are descended without masses, no penile discharge, no lesions, no hernia    Rectal exam: Tight sphincter tone, prostate slightly enlarged without nodules, no masses, no tenderness, guaiac negative stool    Psych:            Alert and oriented x3, normal affect    Neuro:            Cranial nerves intact, Motor strength 5/5 upper and lower extremities,                                 DTRs 2+, sensation intact to light touch, negative Romberg        Assessment and Plan. The following treatment and monitoring plan is recommended:      1. Medicare annual wellness visit, subsequent  Negative screening for depression, dementia, fall risk.  Up-to-date with colonoscopy and immunizations except for Shingrix.  First dose of Shingrix was given today.    2. Essential hypertension  Controlled on blood pressure medication which is hydrochlorothiazide.    3. Dyslipidemia  Patient is managing this with his own efforts only.  We will do updated blood work.    4. Idiopathic thrombocytopenic purpura (HCC)  He continues to follow-up with his hematologist Dr. Gay.  This is a stable mild thrombocytopenia.  Last platelet count in September  2020 was 133K    5. Conduct disorder, undifferentiated type  Patient is followed by his psychiatrist.  He is on the same medications and is stable on them which are olanzapine, venlafaxine, prazosin.    6. Intellectual disability  He continues to live independently in a group home setting managed by [facility].    7. Need for shingles vaccine  He was given first dose of Shingrix.  He will return for second dose in 2 to 6 months time.    8. Screening for prostate cancer  We will do screening PSA with the next blood work.        Services suggested: No services needed at this time  Health Care Screening recommendations as per orders if indicated.  Referrals offered: PT/OT/Nutrition counseling/Behavioral Health/Smoking cessation as per orders if indicated.    Discussion today about general wellness and lifestyle habits:    · Prevent falls and reduce trip hazards; Cautioned about securing or removing rugs.  · Have a working fire alarm and carbon monoxide detector;   · Engage in regular physical activity and social activities       Follow-up: Follow-up in 6 months.

## 2021-05-06 RX ORDER — OLANZAPINE 20 MG/1
20 TABLET ORAL 2 TIMES DAILY
COMMUNITY

## 2021-05-06 RX ORDER — LAMOTRIGINE 150 MG/1
150 TABLET ORAL DAILY
COMMUNITY

## 2021-05-06 RX ORDER — VENLAFAXINE 100 MG/1
100 TABLET ORAL DAILY
COMMUNITY

## 2021-05-06 RX ORDER — PRAZOSIN HYDROCHLORIDE 2 MG/1
2 CAPSULE ORAL 2 TIMES DAILY
COMMUNITY

## 2021-05-12 RX ORDER — MULTIVITAMIN
TABLET ORAL
Qty: 30 TABLET | Refills: 10 | Status: SHIPPED | OUTPATIENT
Start: 2021-05-12 | End: 2022-04-12

## 2021-05-12 RX ORDER — HYDROCHLOROTHIAZIDE 12.5 MG/1
12.5 CAPSULE, GELATIN COATED ORAL DAILY
Qty: 30 CAPSULE | Refills: 10 | Status: SHIPPED | OUTPATIENT
Start: 2021-05-12 | End: 2022-04-12

## 2021-06-04 ENCOUNTER — NON-PROVIDER VISIT (OUTPATIENT)
Dept: MEDICAL GROUP | Facility: PHYSICIAN GROUP | Age: 51
End: 2021-06-04
Payer: MEDICARE

## 2021-06-04 ENCOUNTER — HOSPITAL ENCOUNTER (OUTPATIENT)
Dept: LAB | Facility: MEDICAL CENTER | Age: 51
End: 2021-06-04
Attending: FAMILY MEDICINE
Payer: MEDICARE

## 2021-06-04 DIAGNOSIS — Z12.5 SCREENING FOR PROSTATE CANCER: ICD-10-CM

## 2021-06-04 DIAGNOSIS — Z23 IMMUNIZATION DUE: ICD-10-CM

## 2021-06-04 DIAGNOSIS — F79 INTELLECTUAL DISABILITY: ICD-10-CM

## 2021-06-04 DIAGNOSIS — E78.5 DYSLIPIDEMIA: ICD-10-CM

## 2021-06-04 DIAGNOSIS — I10 ESSENTIAL HYPERTENSION: ICD-10-CM

## 2021-06-04 DIAGNOSIS — F91.8 CONDUCT DISORDER, UNDIFFERENTIATED TYPE: ICD-10-CM

## 2021-06-04 DIAGNOSIS — D69.3 IDIOPATHIC THROMBOCYTOPENIC PURPURA (HCC): ICD-10-CM

## 2021-06-04 LAB
ALBUMIN SERPL BCP-MCNC: 4.2 G/DL (ref 3.2–4.9)
ALBUMIN/GLOB SERPL: 1.7 G/DL
ALP SERPL-CCNC: 87 U/L (ref 30–99)
ALT SERPL-CCNC: 26 U/L (ref 2–50)
ANION GAP SERPL CALC-SCNC: 9 MMOL/L (ref 7–16)
AST SERPL-CCNC: 18 U/L (ref 12–45)
BASOPHILS # BLD AUTO: 0.7 % (ref 0–1.8)
BASOPHILS # BLD: 0.05 K/UL (ref 0–0.12)
BILIRUB SERPL-MCNC: 0.3 MG/DL (ref 0.1–1.5)
BUN SERPL-MCNC: 27 MG/DL (ref 8–22)
CALCIUM SERPL-MCNC: 9.2 MG/DL (ref 8.5–10.5)
CHLORIDE SERPL-SCNC: 109 MMOL/L (ref 96–112)
CHOLEST SERPL-MCNC: 163 MG/DL (ref 100–199)
CO2 SERPL-SCNC: 26 MMOL/L (ref 20–33)
CREAT SERPL-MCNC: 0.89 MG/DL (ref 0.5–1.4)
EOSINOPHIL # BLD AUTO: 0.35 K/UL (ref 0–0.51)
EOSINOPHIL NFR BLD: 5.2 % (ref 0–6.9)
ERYTHROCYTE [DISTWIDTH] IN BLOOD BY AUTOMATED COUNT: 43.8 FL (ref 35.9–50)
FASTING STATUS PATIENT QL REPORTED: NORMAL
GLOBULIN SER CALC-MCNC: 2.5 G/DL (ref 1.9–3.5)
GLUCOSE SERPL-MCNC: 91 MG/DL (ref 65–99)
HCT VFR BLD AUTO: 49.2 % (ref 42–52)
HDLC SERPL-MCNC: 37 MG/DL
HGB BLD-MCNC: 16 G/DL (ref 14–18)
IMM GRANULOCYTES # BLD AUTO: 0.06 K/UL (ref 0–0.11)
IMM GRANULOCYTES NFR BLD AUTO: 0.9 % (ref 0–0.9)
LDLC SERPL CALC-MCNC: 86 MG/DL
LYMPHOCYTES # BLD AUTO: 2.22 K/UL (ref 1–4.8)
LYMPHOCYTES NFR BLD: 33.1 % (ref 22–41)
MCH RBC QN AUTO: 30.3 PG (ref 27–33)
MCHC RBC AUTO-ENTMCNC: 32.5 G/DL (ref 33.7–35.3)
MCV RBC AUTO: 93.2 FL (ref 81.4–97.8)
MONOCYTES # BLD AUTO: 0.49 K/UL (ref 0–0.85)
MONOCYTES NFR BLD AUTO: 7.3 % (ref 0–13.4)
NEUTROPHILS # BLD AUTO: 3.53 K/UL (ref 1.82–7.42)
NEUTROPHILS NFR BLD: 52.8 % (ref 44–72)
NRBC # BLD AUTO: 0 K/UL
NRBC BLD-RTO: 0 /100 WBC
PLATELET # BLD AUTO: 132 K/UL (ref 164–446)
PMV BLD AUTO: 11.7 FL (ref 9–12.9)
POTASSIUM SERPL-SCNC: 4.3 MMOL/L (ref 3.6–5.5)
PROT SERPL-MCNC: 6.7 G/DL (ref 6–8.2)
PSA SERPL-MCNC: 0.8 NG/ML (ref 0–4)
RBC # BLD AUTO: 5.28 M/UL (ref 4.7–6.1)
SODIUM SERPL-SCNC: 144 MMOL/L (ref 135–145)
TRIGL SERPL-MCNC: 199 MG/DL (ref 0–149)
WBC # BLD AUTO: 6.7 K/UL (ref 4.8–10.8)

## 2021-06-04 PROCEDURE — 84153 ASSAY OF PSA TOTAL: CPT

## 2021-06-04 PROCEDURE — 90715 TDAP VACCINE 7 YRS/> IM: CPT | Performed by: FAMILY MEDICINE

## 2021-06-04 PROCEDURE — 85025 COMPLETE CBC W/AUTO DIFF WBC: CPT

## 2021-06-04 PROCEDURE — 80053 COMPREHEN METABOLIC PANEL: CPT

## 2021-06-04 PROCEDURE — 36415 COLL VENOUS BLD VENIPUNCTURE: CPT

## 2021-06-04 PROCEDURE — 80061 LIPID PANEL: CPT

## 2021-06-04 PROCEDURE — 90471 IMMUNIZATION ADMIN: CPT | Performed by: FAMILY MEDICINE

## 2021-06-04 NOTE — PATIENT INSTRUCTIONS
Patient instructions given regarding labs, x-rays, medications, referral and followup appointment.  
[[id number]]

## 2021-06-04 NOTE — NON-PROVIDER
"Juan J Montiel is a 51 y.o. male here for a non-provider visit for:   TDAP    Reason for immunization: Overdue/Provider Recommended  Immunization records indicate need for vaccine: Yes, confirmed with Epic  Minimum interval has been met for this vaccine: Yes  ABN completed: No    VIS Dated  4/1/20 was given to patient: Yes  All IAC Questionnaire questions were answered \"No.\"    Patient tolerated injection and no adverse effects were observed or reported: Yes    Pt scheduled for next dose in series: Not Indicated    "

## 2021-08-19 ENCOUNTER — PATIENT MESSAGE (OUTPATIENT)
Dept: HEALTH INFORMATION MANAGEMENT | Facility: OTHER | Age: 51
End: 2021-08-19

## 2021-11-24 ENCOUNTER — OFFICE VISIT (OUTPATIENT)
Dept: MEDICAL GROUP | Facility: PHYSICIAN GROUP | Age: 51
End: 2021-11-24
Payer: MEDICARE

## 2021-11-24 VITALS
OXYGEN SATURATION: 93 % | WEIGHT: 231.7 LBS | DIASTOLIC BLOOD PRESSURE: 60 MMHG | SYSTOLIC BLOOD PRESSURE: 110 MMHG | TEMPERATURE: 96.7 F | HEART RATE: 85 BPM | HEIGHT: 73 IN | BODY MASS INDEX: 30.71 KG/M2

## 2021-11-24 DIAGNOSIS — Z23 NEED FOR SHINGLES VACCINE: ICD-10-CM

## 2021-11-24 DIAGNOSIS — E78.5 DYSLIPIDEMIA: ICD-10-CM

## 2021-11-24 DIAGNOSIS — Z12.5 SCREENING FOR PROSTATE CANCER: ICD-10-CM

## 2021-11-24 DIAGNOSIS — D69.3 IDIOPATHIC THROMBOCYTOPENIC PURPURA (HCC): ICD-10-CM

## 2021-11-24 DIAGNOSIS — I10 ESSENTIAL HYPERTENSION: ICD-10-CM

## 2021-11-24 PROCEDURE — 90471 IMMUNIZATION ADMIN: CPT | Performed by: FAMILY MEDICINE

## 2021-11-24 PROCEDURE — 90750 HZV VACC RECOMBINANT IM: CPT | Performed by: FAMILY MEDICINE

## 2021-11-24 PROCEDURE — 99214 OFFICE O/P EST MOD 30 MIN: CPT | Mod: 25 | Performed by: FAMILY MEDICINE

## 2021-11-24 ASSESSMENT — FIBROSIS 4 INDEX: FIB4 SCORE: 1.36

## 2021-11-25 NOTE — PROGRESS NOTES
"Subjective     Juan J Montiel is a 51 y.o. male who presents with Hypertension and Immunizations (shingrix)            HPI     Patient should follow-up of his medical problems as well as for second dose of shingles vaccine.  According to his , he already got his flu shot at his group home.  He also got his booster shot for the COVID-19.    In terms of his hypertension, this is under control on hydrochlorothiazide.    We follow him for dyslipidemia and he is managing this with his own efforts.  The last lipid panel came back triglycerides slightly high at 199, HDL went down below 40 at 37 but LDL was still at goal at 86.    We follow him for ITP.  He sees his hematologist/oncologist Dr. Gay for this as well.  Platelet count has been stable in the last 3 years running from 103-133k.    Past medical history, past surgical history, family history reviewed-no changes    Social history reviewed-no changes    Allergies reviewed-no changes    Medications reviewed-no changes    ROS     As per HPI, the rest are negative.           Objective     /60 (BP Location: Left arm, Patient Position: Sitting, BP Cuff Size: Adult)   Pulse 85   Temp 35.9 °C (96.7 °F) (Temporal)   Ht 1.854 m (6' 1\")   Wt 105 kg (231 lb 11.3 oz)   SpO2 93%   BMI 30.57 kg/m²      Physical Exam     Examined alert, awake, oriented, not in distress    Neck-supple, no lymphadenopathy, no thyromegaly  Lungs-clear to auscultation, no rales, no wheezes  Heart-regular rate and rhythm, no murmur  Extremities-no edema, clubbing, cyanosis                        Assessment & Plan        1. Essential hypertension  Controlled on hydrochlorothiazide.  Continue medication.  - Lipid Profile; Future  - Comp Metabolic Panel; Future  - CBC WITH DIFFERENTIAL; Future  - PROSTATE SPECIFIC AG SCREENING; Future    2. Dyslipidemia  He is managing this with his own efforts.  We discussed watching the carbs and not avoiding the sweets, regular exercises to improve " the triglycerides and HDL.  Recheck blood work next visit.  - Lipid Profile; Future  - Comp Metabolic Panel; Future  - CBC WITH DIFFERENTIAL; Future  - PROSTATE SPECIFIC AG SCREENING; Future    3. Idiopathic thrombocytopenic purpura (HCC)  Stable platelet count.  He is followed by his hematologist as well Dr. Gay.  We will continue to follow along.  - Lipid Profile; Future  - Comp Metabolic Panel; Future  - CBC WITH DIFFERENTIAL; Future  - PROSTATE SPECIFIC AG SCREENING; Future    4. Screening for prostate cancer  We will do screening PSA with the next blood work.  - Lipid Profile; Future  - Comp Metabolic Panel; Future  - CBC WITH DIFFERENTIAL; Future  - PROSTATE SPECIFIC AG SCREENING; Future    5. Need for shingles vaccine  Second dose of Shingrix given.  Made aware of potential side effects and how he can manage them.  - Shingles Vaccine (Shingrix)     will give us information of the flu shot since we did not get the report.    Follow-up in 7 months and we will do annual wellness visit at that time.      Please note that this dictation was created using voice recognition software. I have worked with consultants from the vendor as well as technical experts from [employer] to optimize the interface. I have made every reasonable attempt to correct obvious errors, but I expect that there are errors of grammar and possibly content I did not discover before finalizing the note.

## 2022-01-18 ENCOUNTER — PATIENT MESSAGE (OUTPATIENT)
Dept: HEALTH INFORMATION MANAGEMENT | Facility: OTHER | Age: 52
End: 2022-01-18
Payer: MEDICARE

## 2022-01-21 ENCOUNTER — TELEMEDICINE (OUTPATIENT)
Dept: BEHAVIORAL HEALTH | Facility: CLINIC | Age: 52
End: 2022-01-21
Payer: MEDICARE

## 2022-01-21 DIAGNOSIS — F90.9 ATTENTION DEFICIT HYPERACTIVITY DISORDER (ADHD), UNSPECIFIED ADHD TYPE: ICD-10-CM

## 2022-01-21 DIAGNOSIS — F91.8 CONDUCT DISORDER, UNDIFFERENTIATED TYPE: ICD-10-CM

## 2022-01-21 DIAGNOSIS — F79 INTELLECTUAL DISABILITY: ICD-10-CM

## 2022-01-21 PROCEDURE — 90791 PSYCH DIAGNOSTIC EVALUATION: CPT | Performed by: MARRIAGE & FAMILY THERAPIST

## 2022-01-21 RX ORDER — HALOPERIDOL 2 MG/1
2 TABLET ORAL 3 TIMES DAILY
COMMUNITY

## 2022-01-21 NOTE — PROGRESS NOTES
[facility]   Initial Assessment    This visit was conducted via Zoom using secure and encrypted videoconferencing technology.  The patient was in a private location in the [facility].  The patient's identity was confirmed and verbal consent was obtained for this virtual visit.       Name: Juan J Montiel  MRN: [id number]  : 1970  Age: 51 y.o.  Date of assessment: 2022  PCP: Jalyn Reaves M.D.  Persons in attendance: Patient and Staff members  Total session time: 55 minutes      CHIEF COMPLAINT AND HISTORY OF PRESENTING PROBLEM:  (as stated by Patient and staff members, Skylar and Domi,):  Juan J Montiel is a 51 y.o., White male referred for assessment by Jalyn Reaves M.D..  Primary presenting issue includes   Chief Complaint   Patient presents with   • Initial  Evaluation   .Patient is a 51 year old male who reportedly was left in the hospital at birth by his mother, He was cared for by his father and still has some contact with relatives. He lives in a group home ([facility]) with 5 other residents and full time staff.  Anxiety about not having the opportunity to smoke cigarettes. He engages in Fecal smearing, Urinating in room, Increasing negative behaviors when activities are restricted. The patient reportedly threatens and bullies other residents.       BEHAVIORAL HEALTH TREATMENT HISTORY  Does patient/parent report a history of prior behavioral health treatment for patient? No:  History of untreated behavioral health issues identified? Yes  Does patient/parent report change in appetite or weight loss/gain? Sneaks food  Does patient/parent report physical pain? No              Indicate if pain is acute or chronic, and location: N/a                Pain scale rating:           FAMILY/SOCIAL HISTORY  Current living situation/household members: Group home   Does patient/parent report a family history of behavioral health issues, diagnoses, or treatment?   History reviewed. No  pertinent family history.       EMPLOYMENT/RESOURCES  Is the patient currently employed? No  Does the patient/parent report adequate financial resources? Yes       HISTORY:  Does patient report current or past enlistment? No               [If yes, complete below items]  Does patient report history of exposure to combat? No      SPIRITUAL/CULTURAL/IDENTITY:  What are the patient's/family's spiritual beliefs or practices? No      ABUSE/NEGLECT/TRAUMA SCREENING  Does patient report feeling “unsafe” in his/her home, or afraid of anyone? No  Does patient report any history of physical, sexual, or emotional abuse? No  Is there evidence of neglect by self? Patient needs help from staff with activities of daily living. Will not shower if not cohersed.  Is there evidence of neglect by a caregiver? No                                                                                                          SAFETY ASSESSMENT - SELF  Does patient acknowledge current or past symptoms of dangerousness to self? No  Recent change in frequency/specificity/intensity of suicidal thoughts or self-harm behavior? No  Current access to firearms, medications, or other identified means of suicide/self-harm? No  If yes, willing to restrict access to means of suicide/self-harm? No      Current Suicide Risk: Not applicable  Crisis Safety Plan completed and copy given to patient: No      SAFETY ASSESSMENT - OTHERS  Recent change in frequency/specificity/intensity of thoughts or threats to harm others? Hits others  If Yes:  Current access to firearms/other identified means of harm?   If yes, willing to restrict access to weapons/means of harm?     Current Homicide Risk:  Not applicable  Crisis Safety Plan completed and copy given to patient? No  Based on information provided during the current assessment, is a mandated “duty to warn” being exercised? No      SUBSTANCE USE/ADDICTION HISTORY  Patient denies use of any substance/addictive  behaviors No    If No:  Is there a family history of substance use/addiction? Unknown  Does patient acknowledge or parent/significant other report use of/dependence on substances? No  Last time patient used alcohol: N/A   Within the past week? No  Last time patient used marijuana: N/A  Within the past month? No  Any other street drugs ever tried even once? No  Any use of prescription medications/pills without a prescription, or for reasons others than originally prescribed?  No  Any other addictive behavior reported (gambling, shopping, sex)? No     Drug History:  Amphetamine:      Cannibis:      Cocaine:      Ecstasy:      Hallucinogen:      Inhalant:       Opiate:      Other:      Sedative:           MENTAL STATUS/OBSERVATIONS              Participation: No verbal participation and Attentive  Grooming: Casual and Disheveled  Orientation:Alert   Behavior: Unusual behaviors noted and distracted  Eye contact: Limited          Mood:Euthymic  Affect:Flexible, Congruent with content and shy  Thought process: Flight of ideas and Loose associations  Thought content:  Preoccupation  Speech: Rate within normal limits, Volume within normal limits and Latency of response  Perception: Within normal limits  Memory: Recent:  Limited and Remote:  Limited  Insight: Limited  Judgment:  Poor  Other:               Family/couple interaction observations: n/a      Patient's motivation/readiness for change: fair     Topics addressed in psychotherapy include: Patients care givers would like help with lying, (sometimes no reason), aggressive behaviors towards roommates, bullying other residents.     Care plan completed: No  Does patient express agreement with the above plan? Yes     Diagnosis:  1. Attention deficit hyperactivity disorder (ADHD), unspecified ADHD type    2. Intellectual disability    3. Conduct disorder, undifferentiated type        Referral appointment(s) scheduled? No       JOURDAN Chin

## 2022-04-12 ENCOUNTER — TELEMEDICINE (OUTPATIENT)
Dept: BEHAVIORAL HEALTH | Facility: CLINIC | Age: 52
End: 2022-04-12
Payer: MEDICARE

## 2022-04-12 DIAGNOSIS — F90.9 ATTENTION DEFICIT HYPERACTIVITY DISORDER (ADHD), UNSPECIFIED ADHD TYPE: ICD-10-CM

## 2022-04-12 DIAGNOSIS — F91.8 CONDUCT DISORDER, UNDIFFERENTIATED TYPE: ICD-10-CM

## 2022-04-12 PROCEDURE — 90837 PSYTX W PT 60 MINUTES: CPT | Mod: 95 | Performed by: MARRIAGE & FAMILY THERAPIST

## 2022-04-12 NOTE — PROGRESS NOTES
[facility]   Therapy Progress Note      This visit was conducted via Zoom using secure and encrypted videoconferencing technology.  The patient was in a private location in the [facility].  The patient's identity was confirmed and verbal consent was obtained for this virtual visit.  Place of Service: POS 10 -The patient is at Home during their visit    Name: Juan J Montiel  MRN: [id number]  : 1970  Age: 52 y.o.  Date of assessment: 2022  PCP: Jalyn Reaves M.D.  Persons in attendance: Patient  Total session time: 55 minutes    Objective Observations:   Participation:Active verbal participation and Attentive   Grooming:Casual   Cognition:Fully Oriented   Eye Contact:Limited   Mood:Euthymic and Anxious   Affect:Flexible and Full range   Thought Process:Circumstantial   Speech:Rate within normal limits and Volume within normal limits    Current Risk:   Suicide: low   Homicide: low   Self-Harm: low   Relapse: low   Safety Plan Reviewed: not applicable    Topics addressed in psychotherapy include: Met with the patient via zoom for an individual counseling session. The patients if the staff reported increased anger and aggression in his living situation.  Worked with patient on identifying priorities including getting a job which would allow him some time out of his living situation.  Assisted the patient in identifying positive interaction skills with his staff members.  The patient is agreeable however questionable if he has the ability to follow through.        This dictation has been created using voice recognition software and/or scribes. The accuracy of the dictation is limited by the abilities of the software and the expertise of the scribes. I expect there may be some errors of grammar and possibly content. I made every attempt to manually correct the errors within my dictation. However, errors related to voice recognition software and/or scribes may still exist and should be  interpreted within the appropriate context.    Care Plan Updated: Yes    Does patient express agreement with the above plan? Yes     Diagnosis:  1. Conduct disorder, undifferentiated type    2. Attention deficit hyperactivity disorder (ADHD), unspecified ADHD type        Referral appointment(s) scheduled? GURPREET Calvin.

## 2022-04-26 ENCOUNTER — TELEPHONE (OUTPATIENT)
Dept: HEALTH INFORMATION MANAGEMENT | Facility: OTHER | Age: 52
End: 2022-04-26

## 2022-06-13 ENCOUNTER — TELEMEDICINE (OUTPATIENT)
Dept: BEHAVIORAL HEALTH | Facility: CLINIC | Age: 52
End: 2022-06-13
Payer: MEDICARE

## 2022-06-13 DIAGNOSIS — F91.8 CONDUCT DISORDER, UNDIFFERENTIATED TYPE: ICD-10-CM

## 2022-06-13 DIAGNOSIS — F79 INTELLECTUAL DISABILITY: ICD-10-CM

## 2022-06-13 PROCEDURE — 90834 PSYTX W PT 45 MINUTES: CPT | Mod: 95 | Performed by: MARRIAGE & FAMILY THERAPIST

## 2022-06-13 NOTE — PROGRESS NOTES
[facility]   Therapy Progress Note      This visit was conducted via Zoom using secure and encrypted videoconferencing technology.  The patient was in a private location in the [facility].  The patient's identity was confirmed and verbal consent was obtained for this virtual visit.  Place of Service: POS 10 -The patient is at Home during their visit      Name: Juan J Montiel  MRN: [id number]  : 1970  Age: 52 y.o.  Date of assessment: 2022  PCP: Jalyn Reaves M.D.  Persons in attendance: Patient and , staff members were nearby and accessable as needed but not part of the session.   Total session time: 45 minutes    Objective Observations:   Participation:Active verbal participation, Attentive and Engaged   Grooming:Casual   Cognition:Alert and Fully Oriented   Eye Contact:Limited   Mood:Euthymic   Affect:Flexible and Full range   Thought Process:Logical and Goal-directed   Speech:Rate within normal limits and Volume within normal limits    Current Risk:   Suicide: low   Homicide: low   Self-Harm: low   Relapse: low   Safety Plan Reviewed: not applicable    Topics addressed in psychotherapy include: Met the patient via zoom for an individual counseling session.    The patients staff was nearby and helpful in prompting him with concerns.  The patient discussed that there is a lock on his housemates refrigerator because the patient takes sodas.  Worked with the patient understand limiting his consumption when he first receives his drinks may allow him to not need to steal his housemates drinks.  Worked with the patient on managing boundaries and accepting them.  The patient reported that he was not interested in getting a job.  He reported that he is fine at home.  A minimal progress is expected and reported due to the patient’s limitations.    This dictation has been created using voice recognition software and/or scribes. The accuracy of the dictation is limited by the abilities of the  software and the expertise of the scribes. I expect there may be some errors of grammar and possibly content. I made every attempt to manually correct the errors within my dictation. However, errors related to voice recognition software and/or scribes may still exist and should be interpreted within the appropriate context.    Care Plan Updated: No    Does patient express agreement with the above plan? Yes     Diagnosis:  1. Conduct disorder, undifferentiated type    2. Intellectual disability        Referral appointment(s) scheduled? No       GURPREET Chin.

## 2022-06-21 ENCOUNTER — OFFICE VISIT (OUTPATIENT)
Dept: MEDICAL GROUP | Facility: PHYSICIAN GROUP | Age: 52
End: 2022-06-21
Payer: MEDICARE

## 2022-06-21 VITALS
BODY MASS INDEX: 28.6 KG/M2 | OXYGEN SATURATION: 93 % | HEIGHT: 73 IN | SYSTOLIC BLOOD PRESSURE: 90 MMHG | WEIGHT: 215.83 LBS | TEMPERATURE: 98.2 F | HEART RATE: 91 BPM | DIASTOLIC BLOOD PRESSURE: 60 MMHG

## 2022-06-21 DIAGNOSIS — F79 INTELLECTUAL DISABILITY: ICD-10-CM

## 2022-06-21 DIAGNOSIS — F91.8 CONDUCT DISORDER, UNDIFFERENTIATED TYPE: ICD-10-CM

## 2022-06-21 DIAGNOSIS — E78.5 DYSLIPIDEMIA: ICD-10-CM

## 2022-06-21 DIAGNOSIS — I10 ESSENTIAL HYPERTENSION: ICD-10-CM

## 2022-06-21 DIAGNOSIS — D69.3 IDIOPATHIC THROMBOCYTOPENIC PURPURA (HCC): ICD-10-CM

## 2022-06-21 DIAGNOSIS — F90.9 ATTENTION DEFICIT HYPERACTIVITY DISORDER (ADHD), UNSPECIFIED ADHD TYPE: ICD-10-CM

## 2022-06-21 DIAGNOSIS — Z00.00 MEDICARE ANNUAL WELLNESS VISIT, SUBSEQUENT: ICD-10-CM

## 2022-06-21 PROCEDURE — G0439 PPPS, SUBSEQ VISIT: HCPCS | Performed by: FAMILY MEDICINE

## 2022-06-21 RX ORDER — LAMOTRIGINE 200 MG/1
TABLET ORAL
COMMUNITY
Start: 2022-06-16

## 2022-06-21 RX ORDER — ZIPRASIDONE HYDROCHLORIDE 40 MG/1
CAPSULE ORAL
COMMUNITY
Start: 2022-06-16

## 2022-06-21 ASSESSMENT — ACTIVITIES OF DAILY LIVING (ADL): BATHING_REQUIRES_ASSISTANCE: 0

## 2022-06-21 ASSESSMENT — PATIENT HEALTH QUESTIONNAIRE - PHQ9
CLINICAL INTERPRETATION OF PHQ2 SCORE: 1
SUM OF ALL RESPONSES TO PHQ QUESTIONS 1-9: 7
5. POOR APPETITE OR OVEREATING: 0 - NOT AT ALL

## 2022-06-21 ASSESSMENT — ENCOUNTER SYMPTOMS: GENERAL WELL-BEING: EXCELLENT

## 2022-06-21 ASSESSMENT — FIBROSIS 4 INDEX: FIB4 SCORE: 1.39

## 2022-06-21 NOTE — PROGRESS NOTES
Chief Complaint   Patient presents with   • Annual Exam     AWV AHA       HPI:  Juan J is a 52 y.o. here for Medicare Annual Wellness Visit        Patient Active Problem List    Diagnosis Date Noted   • Obesity (BMI 30-39.9) 04/15/2019   • Tobacco user 09/28/2015   • Essential hypertension 08/05/2015   • Idiopathic thrombocytopenic purpura (HCC) 08/05/2015   • Dyslipidemia 03/03/2014   • ADD (attention deficit disorder) 02/04/2014   • Intellectual disability    • Conduct disorder, undifferentiated type        Current Outpatient Medications   Medication Sig Dispense Refill   • hydrochlorothiazide (MICROZIDE) 12.5 MG capsule TAKE 1 CAPSULE BY MOUTH ONCE DAILY 28 Capsule 11   • Multiple Vitamin (MULTIVITAMIN) Tab TAKE 1 TABLET BY MOUTH ONCE DAILY 28 Tablet 11   • haloperidol (HALDOL) 2 MG Tab Take 2 mg by mouth 3 times a day.     • prazosin (MINIPRESS) 2 MG Cap Take 2 mg by mouth 2 times a day.     • olanzapine (ZYPREXA) 20 MG tablet Take 20 mg by mouth 2 times a day.     • venlafaxine (EFFEXOR) 100 MG tablet Take 100 mg by mouth every day.     • lamotrigine (LAMICTAL) 150 MG tablet Take 150 mg by mouth every day.     • Prenatal Vit-Fe Fumarate-FA (M-VIT PO) Take  by mouth. (Patient not taking: No sig reported)     • Prenatal Vit-Fe Fumarate-FA (M-VIT) Tab Take  by mouth. (Patient not taking: No sig reported)       No current facility-administered medications for this visit.        Patient is taking medications as noted in medication list.  Current supplements as per medication list.     Allergies: Patient has no known allergies.    Current social contact/activities:  charlene    Is patient current with immunizations? Yes.    He  reports that he has been smoking cigarettes. He has been smoking about 0.75 packs per day. He has never used smokeless tobacco. He reports current alcohol use. He reports that he does not use drugs.  Ready to quit: Not Answered  Counseling given: Not Answered  Comment: started at age  21      DPA/Advanced directive: Patient does not have an Advanced Directive.  A packet and workshop information was given on Advanced Directives.    ROS:    Gait: Uses no assistive device   Ostomy: No   Other tubes: No   Amputations: No   Chronic oxygen use No   Last eye exam 2022   Wears hearing aids: No   : Denies any urinary leakage during the last 6 months      Screening:    Depression Screening    Little interest or pleasure in doing things?  0 - not at all   Feeling down, depressed , or hopeless? 1 - several days   Trouble falling or staying asleep, or sleeping too much?  3 - nearly every day   Feeling tired or having little energy?  3 - nearly every day   Poor appetite or overeating?  0 - not at all   Feeling bad about yourself - or that you are a failure or have let yourself or your family down? 0 - not at all   Trouble concentrating on things, such as reading the newspaper or watching television? 0 - not at all   Moving or speaking so slowly that other people could have noticed.  Or the opposite - being so fidgety or restless that you have been moving around a lot more than usual?  0 - not at all   Thoughts that you would be better off dead, or of hurting yourself?  0 - not at all   Patient Health Questionnaire Score: 7       If depressive symptoms identified deferred to follow up visit unless specifically addressed in assesment and plan.    Interpretation of PHQ-9 Total Score   Score Severity   1-4 No Depression   5-9 Mild Depression   10-14 Moderate Depression   15-19 Moderately Severe Depression   20-27 Severe Depression    If depressive symptoms identified deferred to follow up visit unless specifically addressed in assessment and plan.        Screening for Cognitive Impairment  Three Minute Recall (daughter, heaven, tri)   3/3    Draw clock face with all 12 numbers and set the hands to show 10 past 11.  Yes    If cognitive concerns identified, deferred for follow up unless specifically addressed  in assessment and plan.    Fall Risk Assessment  Has the patient had two or more falls in the last year or any fall with injury in the last year?  No  If fall risk identified, deferred for follow up unless specifically addressed in assessment and plan.    Safety Assessment  Throw rugs on floor.  No  Handrails on all stairs.  No  Good lighting in all hallways.  Yes  Difficulty hearing.  No  Patient counseled about all safety risks that were identified.    Functional Assessment ADLs  Are there any barriers preventing you from cooking for yourself or meeting nutritional needs?  No.    Are there any barriers preventing you from driving safely or obtaining transportation?  Yes.    Are there any barriers preventing you from using a telephone or calling for help?  Yes.    Are there any barriers preventing you from shopping?  No.    Are there any barriers preventing you from taking care of your own finances?  Yes.    Are there any barriers preventing you from managing your medications?    Yes.    Are there any barriers preventing you from showering, bathing or dressing yourself?  No.    Are you currently engaging in any exercise or physical activity?  No.     What is your perception of your health?  Excellent.    Health Maintenance Summary          Overdue - IMM PNEUMOCOCCAL VACCINE: 0-64 Years (2 - PCV) Overdue since 5/13/2010 05/13/2009  Imm Admin: Pneumococcal polysaccharide vaccine (PPSV-23)          Overdue - COVID-19 Vaccine (4 - Booster for Moderna series) Overdue since 3/3/2022    11/03/2021  Imm Admin: Moderna SARS-CoV-2 Vaccine    04/06/2021  Imm Admin: Moderna SARS-CoV-2 Vaccine    03/09/2021  Imm Admin: Moderna SARS-CoV-2 Vaccine          Overdue - Annual Wellness Visit (Every 366 Days) Overdue since 5/6/2022 05/05/2021  Visit Dx: Medicare annual wellness visit, subsequent    05/28/2020  Visit Dx: Medicare annual wellness visit, subsequent    04/15/2019  Visit Dx: Medicare annual wellness visit,  subsequent    02/26/2018  Visit Dx: Medicare annual wellness visit, subsequent    02/15/2017  Visit Dx: Medicare annual wellness visit, subsequent    Only the first 5 history entries have been loaded, but more history exists.          IMM INFLUENZA (Season Ended) Next due on 9/1/2022 02/26/2018  Imm Admin: Influenza Vaccine Quad Inj (Pf)    11/02/2016  Imm Admin: Influenza Vaccine Quad Inj (Preserved)    02/08/2016  Imm Admin: Influenza Vaccine Quad Inj (Preserved)    11/19/2014  Imm Admin: Influenza Vaccine Quad Inj (Preserved)    02/04/2014  Imm Admin: INFLUENZA TIV (IM)          COLORECTAL CANCER SCREENING (COLONOSCOPY - Every 7 Years) Next due on 8/21/2027 08/21/2020  REFERRAL TO GI FOR COLONOSCOPY          IMM DTaP/Tdap/Td Vaccine (3 - Td or Tdap) Next due on 6/4/2031 06/04/2021  Imm Admin: Tdap Vaccine    05/13/2009  Imm Admin: Tdap Vaccine          IMM ZOSTER VACCINES (Series Information) Completed    11/24/2021  Imm Admin: Zoster Vaccine Recombinant (RZV) (SHINGRIX)    05/05/2021  Imm Admin: Zoster Vaccine Recombinant (RZV) (SHINGRIX)          IMM HEP B VACCINE (Series Information) Aged Out    No completion history exists for this topic.          IMM MENINGOCOCCAL VACCINE (MCV4) (Series Information) Aged Out    No completion history exists for this topic.                Patient Care Team:  Jalyn Reaves M.D. as PCP - General  Jalyn Reaves M.D. as PCP - [facility] Paneled  Dale Gay M.D. as Consulting Physician (Medical Oncology)  Richard Aguilera Ass't as [facility]     Social History     Tobacco Use   • Smoking status: Current Every Day Smoker     Packs/day: 0.75     Types: Cigarettes   • Smokeless tobacco: Never Used   • Tobacco comment: started at age 21   Vaping Use   • Vaping Use: Never used   Substance Use Topics   • Alcohol use: Yes     Alcohol/week: 0.0 oz     Comment: on holidays   • Drug use: No     History reviewed. No pertinent family history.  He  has a past  "medical history of ADD (attention deficit disorder with hyperactivity), Conduct disorder, undifferentiated type, HTN (hypertension), Mental retardation, and Thrombocytopenia (HCC).   History reviewed. No pertinent surgical history.      Exam:   BP (!) 90/60 (BP Location: Left arm, Patient Position: Sitting, BP Cuff Size: Adult)   Pulse 91   Temp 36.8 °C (98.2 °F) (Temporal)   Ht 1.854 m (6' 1\")   Wt 97.9 kg (215 lb 13.3 oz)   SpO2 93%  Body mass index is 28.48 kg/m².    Hearing excellent.    Dentition good  Alert, oriented in no acute distress  Eye contact is good, speech goal directed, affect calm      Assessment and Plan. The following treatment and monitoring plan is recommended:        1. Medicare annual wellness visit, subsequent  No evidence of dementia, falls on screening.  PHQ-9 score consistent with mild depression.  He said he is sad because of his father is already .  He is followed by the psychiatrist and psychologist and is on psych meds.      2. Essential hypertension  Controlled on blood pressure medication which is hydrochlorothiazide.    3. Dyslipidemia  Triglycerides slightly high at 199 and HDL was low at 37, LDL was at target at 86 when he had his last blood work a year ago in 2021.  Patient is managing this with his own efforts only.  We will do updated blood work.    4. Idiopathic thrombocytopenic purpura (HCC)  He is followed by his hematologist/oncologist.  Platelet count has been stable ranging from 98-133k in the last 5 years.    5. Intellectual disability  He is followed by his psychologist and psychiatrist.  He is on psych meds and under control on the psych meds.  He continues to live in a group home setting.  Continue follow-up with psychologist and psychiatrist.    6. Conduct disorder, undifferentiated type  Same as #5.  His current meds are lamotrigine, Geodon, Haldol, olanzapine, prazosin.    7. Attention deficit hyperactivity disorder (ADHD), unspecified ADHD " type  Same as #5.    Services suggested: No services needed at this time  Health Care Screening recommendations as per orders if indicated.  Referrals offered: PT/OT/Nutrition counseling/Behavioral Health/Smoking cessation as per orders if indicated.    Discussion today about general wellness and lifestyle habits:    · Prevent falls and reduce trip hazards; Cautioned about securing or removing rugs.  · Have a working fire alarm and carbon monoxide detector;   · Engage in regular physical activity and social activities.     Follow-up: Follow-up in 6 months.      Please note that this dictation was created using voice recognition software. I have worked with consultants from the vendor as well as technical experts from [facility] to optimize the interface. I have made every reasonable attempt to correct obvious errors, but I expect that there are errors of grammar and possibly content I did not discover before finalizing the note.

## 2022-06-22 NOTE — TELEPHONE ENCOUNTER
Patient notified of results   Home Suture Removal Text: Patient was provided instructions on removing sutures and will remove their sutures at home.  If they have any questions or difficulties they will call the office.

## 2022-08-22 ENCOUNTER — TELEPHONE (OUTPATIENT)
Dept: HEALTH INFORMATION MANAGEMENT | Facility: OTHER | Age: 52
End: 2022-08-22
Payer: MEDICARE

## 2022-08-26 ENCOUNTER — PATIENT MESSAGE (OUTPATIENT)
Dept: HEALTH INFORMATION MANAGEMENT | Facility: OTHER | Age: 52
End: 2022-08-26

## 2022-08-26 ENCOUNTER — DOCUMENTATION (OUTPATIENT)
Dept: HEALTH INFORMATION MANAGEMENT | Facility: OTHER | Age: 52
End: 2022-08-26
Payer: MEDICARE

## 2022-10-14 ENCOUNTER — DOCUMENTATION (OUTPATIENT)
Dept: HEALTH INFORMATION MANAGEMENT | Facility: OTHER | Age: 52
End: 2022-10-14
Payer: MEDICARE

## 2023-02-03 ENCOUNTER — TELEPHONE (OUTPATIENT)
Dept: HEALTH INFORMATION MANAGEMENT | Facility: OTHER | Age: 53
End: 2023-02-03

## 2023-02-03 NOTE — TELEPHONE ENCOUNTER
OUTCOME: [facility] M[facility] WITH A [facility]CP.      PLEASE TRANSFER TO [facility] [phone] WHEN PT RETURNS CALL.     ATTEMPT # 1

## 2023-03-22 RX ORDER — HYDROCHLOROTHIAZIDE 12.5 MG/1
12.5 CAPSULE, GELATIN COATED ORAL DAILY
Qty: 100 CAPSULE | Refills: 0 | Status: SHIPPED | OUTPATIENT
Start: 2023-03-22

## 2023-05-09 RX ORDER — MULTIVITAMIN
1 TABLET ORAL DAILY
Qty: 28 TABLET | Refills: 0 | Status: SHIPPED | OUTPATIENT
Start: 2023-05-09

## 2023-06-08 ENCOUNTER — DOCUMENTATION (OUTPATIENT)
Dept: HEALTH INFORMATION MANAGEMENT | Facility: OTHER | Age: 53
End: 2023-06-08
Payer: MEDICARE

## 2023-06-08 ENCOUNTER — PATIENT MESSAGE (OUTPATIENT)
Dept: HEALTH INFORMATION MANAGEMENT | Facility: OTHER | Age: 53
End: 2023-06-08

## 2023-08-18 ENCOUNTER — TELEPHONE (OUTPATIENT)
Dept: HEALTH INFORMATION MANAGEMENT | Facility: OTHER | Age: 53
End: 2023-08-18

## 2023-10-02 ENCOUNTER — APPOINTMENT (OUTPATIENT)
Dept: MEDICAL GROUP | Facility: PHYSICIAN GROUP | Age: 53
End: 2023-10-02
Payer: MEDICARE

## 2024-04-16 ENCOUNTER — DOCUMENTATION (OUTPATIENT)
Dept: HEALTH INFORMATION MANAGEMENT | Facility: OTHER | Age: 54
End: 2024-04-16

## 2024-04-16 ENCOUNTER — OFFICE VISIT (OUTPATIENT)
Dept: MEDICAL GROUP | Facility: PHYSICIAN GROUP | Age: 54
End: 2024-04-16
Payer: MEDICARE

## 2024-04-16 VITALS
HEART RATE: 67 BPM | TEMPERATURE: 98 F | SYSTOLIC BLOOD PRESSURE: 102 MMHG | BODY MASS INDEX: 26.37 KG/M2 | WEIGHT: 199 LBS | HEIGHT: 73 IN | OXYGEN SATURATION: 97 % | DIASTOLIC BLOOD PRESSURE: 60 MMHG

## 2024-04-16 DIAGNOSIS — H61.23 EXCESSIVE CERUMEN IN EAR CANAL, BILATERAL: ICD-10-CM

## 2024-04-16 DIAGNOSIS — H66.93 BILATERAL OTITIS MEDIA, UNSPECIFIED OTITIS MEDIA TYPE: ICD-10-CM

## 2024-04-16 PROCEDURE — 99213 OFFICE O/P EST LOW 20 MIN: CPT | Mod: 25 | Performed by: STUDENT IN AN ORGANIZED HEALTH CARE EDUCATION/TRAINING PROGRAM

## 2024-04-16 PROCEDURE — 3074F SYST BP LT 130 MM HG: CPT | Performed by: STUDENT IN AN ORGANIZED HEALTH CARE EDUCATION/TRAINING PROGRAM

## 2024-04-16 PROCEDURE — 3078F DIAST BP <80 MM HG: CPT | Performed by: STUDENT IN AN ORGANIZED HEALTH CARE EDUCATION/TRAINING PROGRAM

## 2024-04-16 PROCEDURE — 69210 REMOVE IMPACTED EAR WAX UNI: CPT | Performed by: STUDENT IN AN ORGANIZED HEALTH CARE EDUCATION/TRAINING PROGRAM

## 2024-04-16 RX ORDER — CIPROFLOXACIN AND DEXAMETHASONE 3; 1 MG/ML; MG/ML
4 SUSPENSION/ DROPS AURICULAR (OTIC) 2 TIMES DAILY
Qty: 7.5 ML | Refills: 0 | Status: SHIPPED | OUTPATIENT
Start: 2024-04-16 | End: 2024-04-21

## 2024-04-16 ASSESSMENT — ENCOUNTER SYMPTOMS
PALPITATIONS: 0
SHORTNESS OF BREATH: 0
FEVER: 0
CHILLS: 0

## 2024-04-16 ASSESSMENT — PATIENT HEALTH QUESTIONNAIRE - PHQ9: CLINICAL INTERPRETATION OF PHQ2 SCORE: 0

## 2024-04-16 NOTE — PROGRESS NOTES
"Subjective:     CC:  Diagnoses of Excessive cerumen in ear canal, bilateral and Bilateral otitis media, unspecified otitis media type were pertinent to this visit.    HISTORY OF THE PRESENT ILLNESS:     Mr. Montiel is a pleasant 54-year-old who presents today for bilateral ear lavage.  Patient reports due to wax buildup he is having difficulty hearing.    Patient Active Problem List    Diagnosis Date Noted    Obesity (BMI 30-39.9) 04/15/2019    Tobacco user 09/28/2015    Essential hypertension 08/05/2015    Idiopathic thrombocytopenic purpura (HCC) 08/05/2015    Dyslipidemia 03/03/2014    ADD (attention deficit disorder) 02/04/2014    Intellectual disability     Conduct disorder, undifferentiated type         Health Maintenance: Not Completed    ROS:   Review of Systems   Constitutional:  Negative for chills and fever.   Respiratory:  Negative for shortness of breath.    Cardiovascular:  Negative for chest pain and palpitations.         Objective:     Exam: /60 (BP Location: Left arm, Patient Position: Sitting, BP Cuff Size: Adult)   Pulse 67   Temp 36.7 °C (98 °F) (Temporal)   Ht 1.854 m (6' 1\")   Wt 90.3 kg (199 lb)   SpO2 97%  Body mass index is 26.25 kg/m².    Physical Exam  HENT:      Right Ear: Decreased hearing noted. A middle ear effusion is present. There is impacted cerumen.      Left Ear: Decreased hearing noted. A middle ear effusion is present. There is impacted cerumen.             Procedure: Cerumen Removal  Risks and benefits of procedure discussed with patient.  Cerumen removed with lavage by the Dr. Reddy. Patient tolerated the procedure well.    Post-lavage curette was performed by Dr. Reddy. Post procedure exam with middle ear effusion, unable to visualize bilateral tympanic membranes.     Pt educated about proper care of ear canal. Q-tip cleaning discouraged, use of debrox and warm water lavage discussed.    Assessment & Plan:   54 y.o. male with the following -    1. Excessive cerumen " in ear canal, bilateral  Patient presenting with excessive cerumen in bilateral ears.  Cerumen was removed with lavage and post lavage curette was performed by myself.  Postprocedure exam with findings of bilateral middle ear effusion, unable to visualize bilateral tympanic membranes.    2. Bilateral otitis media, unspecified otitis media type  See above.  Will prescribe patient Ciprodex to be used twice daily for 5 days.  - ciprofloxacin/dexamethasone (CIPRODEX) 0.3-0.1 % Suspension; Administer 4 Drops into affected ear(s) 2 times a day for 5 days.  Dispense: 7.5 mL; Refill: 0      HCC Gap Form    Last edited 04/16/24 11:05 PDT by Violetta Reddy M.D.         Return in about 2 weeks (around 4/30/2024) for Establish Care.    Please note that this dictation was created using voice recognition software. I have made every reasonable attempt to correct obvious errors, but I expect that there are errors of grammar and possibly content that I did not discover before finalizing the note.

## 2024-05-01 ENCOUNTER — OFFICE VISIT (OUTPATIENT)
Dept: MEDICAL GROUP | Facility: PHYSICIAN GROUP | Age: 54
End: 2024-05-01
Payer: MEDICARE

## 2024-05-01 VITALS
HEIGHT: 73 IN | DIASTOLIC BLOOD PRESSURE: 80 MMHG | HEART RATE: 86 BPM | SYSTOLIC BLOOD PRESSURE: 122 MMHG | BODY MASS INDEX: 26.64 KG/M2 | WEIGHT: 201 LBS | TEMPERATURE: 98.5 F | OXYGEN SATURATION: 95 %

## 2024-05-01 DIAGNOSIS — F91.8 CONDUCT DISORDER, UNDIFFERENTIATED TYPE: ICD-10-CM

## 2024-05-01 DIAGNOSIS — Z02.89 ENCOUNTER FOR COMPLETION OF FORM WITH PATIENT: ICD-10-CM

## 2024-05-01 DIAGNOSIS — D69.3 IDIOPATHIC THROMBOCYTOPENIC PURPURA (HCC): ICD-10-CM

## 2024-05-01 DIAGNOSIS — Z72.0 TOBACCO USER: ICD-10-CM

## 2024-05-01 DIAGNOSIS — I10 ESSENTIAL HYPERTENSION: ICD-10-CM

## 2024-05-01 DIAGNOSIS — Z76.89 ENCOUNTER TO ESTABLISH CARE: ICD-10-CM

## 2024-05-01 DIAGNOSIS — F90.9 ATTENTION DEFICIT HYPERACTIVITY DISORDER (ADHD), UNSPECIFIED ADHD TYPE: ICD-10-CM

## 2024-05-01 PROCEDURE — 3074F SYST BP LT 130 MM HG: CPT | Performed by: STUDENT IN AN ORGANIZED HEALTH CARE EDUCATION/TRAINING PROGRAM

## 2024-05-01 PROCEDURE — 99214 OFFICE O/P EST MOD 30 MIN: CPT | Performed by: STUDENT IN AN ORGANIZED HEALTH CARE EDUCATION/TRAINING PROGRAM

## 2024-05-01 PROCEDURE — 3079F DIAST BP 80-89 MM HG: CPT | Performed by: STUDENT IN AN ORGANIZED HEALTH CARE EDUCATION/TRAINING PROGRAM

## 2024-05-01 RX ORDER — VENLAFAXINE 75 MG/1
75 TABLET ORAL 3 TIMES DAILY
COMMUNITY

## 2024-05-01 RX ORDER — LAMOTRIGINE 200 MG/1
400 TABLET ORAL DAILY
COMMUNITY
Start: 2024-05-01

## 2024-05-01 RX ORDER — LURASIDONE HYDROCHLORIDE 60 MG/1
1 TABLET, FILM COATED ORAL NIGHTLY
COMMUNITY

## 2024-05-01 ASSESSMENT — ENCOUNTER SYMPTOMS
CHILLS: 0
PALPITATIONS: 0
SHORTNESS OF BREATH: 0
FEVER: 0

## 2024-05-01 NOTE — ASSESSMENT & PLAN NOTE
Chronic condition. He is established with a psychiatrist. He is on Effexor 100mg qd,  Lamictal 400mg qd, Lurasidone 60mg qd, Effexor 75mg.

## 2024-05-01 NOTE — PROGRESS NOTES
"Subjective:     CC:  Diagnoses of Encounter to establish care, Essential hypertension, Idiopathic thrombocytopenic purpura (HCC), Attention deficit hyperactivity disorder (ADHD), unspecified ADHD type, Conduct disorder, undifferentiated type, Tobacco user, and Encounter for completion of form with patient were pertinent to this visit.    HISTORY OF THE PRESENT ILLNESS: Patient is a 54 y.o. male. This pleasant patient is here today to establish care.    ADD (attention deficit disorder)   Chronic condition.     Conduct Disorder, Undifferentiated Type  Chronic condition. He is established with a psychiatrist. He is on Effexor 100mg qd,  Lamictal 400mg qd, Lurasidone 60mg qd, Effexor 75mg.    Essential hypertension  Chronic condition. He takes Minipress 2mg qd.    Tobacco user  Patient reports he smokes 13 cigarettes a day. He is not interested in quitting at this time.     Idiopathic thrombocytopenic purpura (HCC)  Chronic condition. Patient is established with Dr. Gay at [facility].       Patient Active Problem List    Diagnosis Date Noted    Obesity (BMI 30-39.9) 04/15/2019    Tobacco user 09/28/2015    Essential hypertension 08/05/2015    Idiopathic thrombocytopenic purpura (HCC) 08/05/2015    Dyslipidemia 03/03/2014    ADD (attention deficit disorder) 02/04/2014    Intellectual disability     Conduct disorder, undifferentiated type        Health Maintenance: Completed    ROS:   Review of Systems   Constitutional:  Negative for chills and fever.   Respiratory:  Negative for shortness of breath.    Cardiovascular:  Negative for chest pain and palpitations.         Objective:     Exam: /80 (BP Location: Left arm, Patient Position: Sitting, BP Cuff Size: Adult)   Pulse 86   Temp 36.9 °C (98.5 °F) (Temporal)   Ht 1.854 m (6' 1\")   Wt 91.2 kg (201 lb)   SpO2 95%  Body mass index is 26.52 kg/m².    Physical Exam  Constitutional:       Appearance: Normal appearance.   HENT:      Head: " Normocephalic.      Right Ear: Tympanic membrane normal.      Left Ear: Tympanic membrane normal.      Nose: Nose normal.      Mouth/Throat:      Mouth: Mucous membranes are moist.   Eyes:      Extraocular Movements: Extraocular movements intact.      Conjunctiva/sclera: Conjunctivae normal.      Pupils: Pupils are equal, round, and reactive to light.   Cardiovascular:      Rate and Rhythm: Normal rate and regular rhythm.   Pulmonary:      Effort: Pulmonary effort is normal. No respiratory distress.      Breath sounds: Normal breath sounds. No stridor. No wheezing or rhonchi.   Abdominal:      General: Abdomen is flat. Bowel sounds are normal.      Palpations: Abdomen is soft.   Musculoskeletal:         General: Normal range of motion.      Cervical back: Normal range of motion and neck supple.   Skin:     General: Skin is warm and dry.   Neurological:      General: No focal deficit present.      Mental Status: He is alert and oriented to person, place, and time.   Psychiatric:         Mood and Affect: Mood normal.           Assessment & Plan:   54 y.o. male with the following -    1. Encounter to establish care  Patient presents today to establish care. Chart was reviewed and history was discussed in detail with the patient.    2. Essential hypertension  Chronic, controlled.  Blood pressure at goal today.  Continue Minipress 2 mg daily.    3. Idiopathic thrombocytopenic purpura (HCC)  Chronic, stable.  Patient is established with Dr. Dale Gay at [facility].  Advised to follow-up with Dr. Gay as scheduled.    4. Attention deficit hyperactivity disorder (ADHD), unspecified ADHD type  Chronic, stable condition.    5. Conduct disorder, undifferentiated type  Chronic, stable condition.  Patient is established with psychiatry.  Continue Lamictal 400 mg daily, Effexor 75 mg daily, and Lurasidone 60mg qd.    6. Tobacco user  Chronic, ongoing.  Patient reports smoking 13 cigarettes a day.  He is not  interested in smoking cessation at this time.    7. Encounter for completion of form with patient  Patient would like to participate in the [employer].  He is hoping to be on the [employer] team.  History and physical form for the [employer] was filled out for the patient.  Copy was made for the chart and scanned in.      HCC Gap Form    Diagnosis to address: D69.3 - Idiopathic thrombocytopenic purpura (HCC)  Assessment and plan: Chronic, stable.  Last edited 05/01/24 15:07 PDT by Violetta Reddy M.D.           Return if symptoms worsen or fail to improve.    Please note that this dictation was created using voice recognition software. I have made every reasonable attempt to correct obvious errors, but I expect that there are errors of grammar and possibly content that I did not discover before finalizing the note.

## 2024-08-26 ENCOUNTER — PATIENT MESSAGE (OUTPATIENT)
Dept: HEALTH INFORMATION MANAGEMENT | Facility: OTHER | Age: 54
End: 2024-08-26

## 2025-03-20 ENCOUNTER — APPOINTMENT (OUTPATIENT)
Dept: FAMILY PLANNING/WOMEN'S HEALTH CLINIC | Facility: PHYSICIAN GROUP | Age: 55
End: 2025-03-20
Attending: FAMILY MEDICINE
Payer: MEDICARE

## 2025-05-01 ENCOUNTER — APPOINTMENT (OUTPATIENT)
Dept: MEDICAL GROUP | Facility: PHYSICIAN GROUP | Age: 55
End: 2025-05-01
Payer: MEDICARE

## 2025-05-01 VITALS
SYSTOLIC BLOOD PRESSURE: 120 MMHG | TEMPERATURE: 98 F | OXYGEN SATURATION: 98 % | BODY MASS INDEX: 28.89 KG/M2 | HEIGHT: 73 IN | HEART RATE: 82 BPM | WEIGHT: 218 LBS | DIASTOLIC BLOOD PRESSURE: 72 MMHG

## 2025-05-01 DIAGNOSIS — Z00.00 WELLNESS EXAMINATION: ICD-10-CM

## 2025-05-01 DIAGNOSIS — F17.210 CIGARETTE NICOTINE DEPENDENCE WITHOUT COMPLICATION: ICD-10-CM

## 2025-05-01 DIAGNOSIS — D69.3 IDIOPATHIC THROMBOCYTOPENIC PURPURA (HCC): ICD-10-CM

## 2025-05-01 DIAGNOSIS — H61.23 IMPACTED CERUMEN OF BOTH EARS: ICD-10-CM

## 2025-05-01 DIAGNOSIS — Z12.5 SCREENING FOR PROSTATE CANCER: ICD-10-CM

## 2025-05-01 PROCEDURE — 3078F DIAST BP <80 MM HG: CPT | Performed by: STUDENT IN AN ORGANIZED HEALTH CARE EDUCATION/TRAINING PROGRAM

## 2025-05-01 PROCEDURE — 99396 PREV VISIT EST AGE 40-64: CPT | Mod: 25 | Performed by: STUDENT IN AN ORGANIZED HEALTH CARE EDUCATION/TRAINING PROGRAM

## 2025-05-01 PROCEDURE — 69210 REMOVE IMPACTED EAR WAX UNI: CPT | Performed by: STUDENT IN AN ORGANIZED HEALTH CARE EDUCATION/TRAINING PROGRAM

## 2025-05-01 PROCEDURE — 3074F SYST BP LT 130 MM HG: CPT | Performed by: STUDENT IN AN ORGANIZED HEALTH CARE EDUCATION/TRAINING PROGRAM

## 2025-05-01 RX ORDER — RISPERIDONE 0.5 MG/1
0.25 TABLET ORAL 2 TIMES DAILY
COMMUNITY

## 2025-05-01 RX ORDER — LURASIDONE HYDROCHLORIDE 80 MG/1
80 TABLET, FILM COATED ORAL
COMMUNITY

## 2025-05-01 RX ORDER — CLONIDINE HYDROCHLORIDE 0.1 MG/1
0.1 TABLET ORAL NIGHTLY
COMMUNITY

## 2025-05-01 ASSESSMENT — PATIENT HEALTH QUESTIONNAIRE - PHQ9: CLINICAL INTERPRETATION OF PHQ2 SCORE: 0

## 2025-05-01 NOTE — PROGRESS NOTES
Subjective:     CC:   Chief Complaint   Patient presents with    Annual Exam       HPI:   EUFEMIA Montiel is a 55 y.o. male who presents for an annual exam. He is feeling well and has no complaints.    Health Maintenance  Advanced directive: N/A  PT for falls prevention: N/A  Cholesterol Screening: Ordered today  Diabetes Screening: Ordered today  AAA Screening: Ordered today  Aspirin Use: N/A    Anticipatory Guidance  Diet: Patient reports that his diet is well balanced.  Exercise: He does walk but does not exercise.   Substance Abuse: N/A  Safe in relationship.   Seat belts, bike helmet, gun safety discussed.  Sun protection used.  Dental home.    Cancer screening  Colorectal Cancer Screening: Completed in 8/2020, due next in 8/2027  Lung Cancer Screening: Ordered today  Prostate Cancer Screening/PSA: Will order today    Infectious disease screening/Immunizations  --STI Screening: Declined  --Practices safe sex.  --HIV Screening: Declined  --Hepatitis C Screening: Declined  --Immunizations:    Influenza: Recommended to receive in the fall   HPV:  N/A   Tetanus: Up-to-date, due next in 6/2031   Shingles: Completed   Pneumococcal: Declined   Hepatitis B: Declined  COVID-19: Recommended to receive at the pharmacy  Other immunizations: N/A    He  has a past medical history of ADD (attention deficit disorder with hyperactivity), Conduct disorder, undifferentiated type, HTN (hypertension), Mental retardation, and Thrombocytopenia (HCC).  He  has a past surgical history that includes cataract extraction with iol (Right, 05/23/2022) and cataract extraction with iol (Left, 06/06/2022).  History reviewed. No pertinent family history.  Social History     Tobacco Use    Smoking status: Every Day     Current packs/day: 0.75     Average packs/day: 0.7 packs/day for 34.3 years (25.7 ttl pk-yrs)     Types: Cigarettes     Start date: 1991    Smokeless tobacco: Never    Tobacco comments:     started at age 21   Vaping Use     Vaping status: Never Used   Substance Use Topics    Alcohol use: Not Currently     Comment: on holidays    Drug use: No       Patient Active Problem List    Diagnosis Date Noted    Obesity (BMI 30-39.9) 04/15/2019    Tobacco user 09/28/2015    Essential hypertension 08/05/2015    Idiopathic thrombocytopenic purpura (HCC) 08/05/2015    Dyslipidemia 03/03/2014    ADD (attention deficit disorder) 02/04/2014    Intellectual disability     Conduct disorder, undifferentiated type        Current Outpatient Medications   Medication Sig Dispense Refill    risperiDONE (RISPERDAL) 0.5 MG Tab Take 0.25 mg by mouth 2 times a day.      lurasidone (LATUDA) 80 MG Tab Take 80 mg by mouth with dinner.      cloNIDine (CATAPRES) 0.1 MG Tab Take 0.1 mg by mouth every evening.      lamotrigine (LAMICTAL) 200 MG tablet Take 2 Tablets by mouth every day.      venlafaxine (EFFEXOR) 75 MG Tab Take 75 mg by mouth 3 times a day.      Multiple Vitamin (MULTIVITAMIN) Tab Take 1 Tablet by mouth every day. NEEDS APPOINTMENT FOR FUTURE REFILLS 28 Tablet 0    prazosin (MINIPRESS) 2 MG Cap Take 2 mg by mouth 2 times a day.       No current facility-administered medications for this visit.    (including changes today)  Allergies: Patient has no known allergies.    Review of Systems   Constitutional: Negative for fever, chills and malaise/fatigue.   HENT: Negative for congestion.    Eyes: Negative for pain.   Respiratory: Negative for cough and shortness of breath.    Cardiovascular: Negative for leg swelling.   Gastrointestinal: Negative for nausea, vomiting, abdominal pain and diarrhea.   Genitourinary: Negative for dysuria and hematuria.   Skin: Negative for rash.   Neurological: Negative for dizziness, focal weakness. He has headaches.   Endo/Heme/Allergies: Does not bleed easily.   Psychiatric/Behavioral: Negative for depression.  The patient is not nervous/anxious.      Objective:     /72 (BP Location: Right arm, Patient Position: Sitting,  "BP Cuff Size: Adult)   Pulse 82   Temp 36.7 °C (98 °F) (Temporal)   Ht 1.854 m (6' 1\")   Wt 98.9 kg (218 lb)   SpO2 98%   BMI 28.76 kg/m²   Body mass index is 28.76 kg/m².  Wt Readings from Last 4 Encounters:   05/01/25 98.9 kg (218 lb)   05/01/24 91.2 kg (201 lb)   04/16/24 90.3 kg (199 lb)   06/21/22 97.9 kg (215 lb 13.3 oz)       Physical Exam:  Constitutional: Well-developed and well-nourished. Not diaphoretic. No distress.   Skin: Skin is warm and dry. No rash noted.  Head: Atraumatic without lesions.  Eyes: Conjunctivae and extraocular motions are normal. Pupils are equal, round, and reactive to light. No scleral icterus.   Ears:  External ears unremarkable. Bilateral ears impacted  Nose: Nares patent. Septum midline. Turbinates without erythema nor edema. No discharge.   Mouth/Throat: Dentition is good. Tongue normal. Oropharynx is clear and moist. Posterior pharynx without erythema or exudates.  Neck: Supple, trachea midline. Normal range of motion. No thyromegaly present. No lymphadenopathy--cervical or supraclavicular.  Cardiovascular: Regular rate and rhythm, S1 and S2 without murmur, rubs, or gallops.    Lungs: Effort normal. Clear to auscultation throughout. No adventitious sounds. No CVA tenderness.  Abdomen: Soft, non tender, and without distention. Active bowel sounds in all four quadrants. No rebound, guarding, masses or HSM.  : Genitalia: Deferred  Rectal: deferred  Prostate: deferred  Extremities: No cyanosis, clubbing, erythema, nor edema. Distal pulses intact and symmetric.   Musculoskeletal: All major joints AROM full in all directions without pain.  Neurological: Alert and oriented x 3. DTRs 2+/3 and symmetric. No cranial nerve deficit. 5/5 myotomes. Sensation intact.  Psychiatric:  Behavior, mood, and affect are appropriate.    Procedure: Cerumen Removal  Risks and benefits of procedure discussed with patient.  Cerumen removed with lavage by Dr. Reddy. Patient tolerated the procedure " well    Post-lavage curette was performed by Dr. Reddy. Post procedure exam with clear canal and normal TM.    Pt educated about proper care of ear canal. Q-tip cleaning discouraged, use of debrox and warm water lavage discussed.          Assessment and Plan:     1. Wellness examination  Patient presents today for annual preventive wellness examination.  Annual labs provided.  Vaccinations declined.  Patient is up-to-date on colon cancer screening.  Referral for lung cancer screening program placed.  - CBC WITH DIFFERENTIAL; Future  - Comp Metabolic Panel; Future  - Lipid Profile; Future    2. Cigarette nicotine dependence without complication  - REFERRAL TO LUNG CANCER SCREENING PROGRAM    3. Screening for prostate cancer  - PROSTATE SPECIFIC AG SCREENING; Future    4. Idiopathic thrombocytopenic purpura (HCC)  Chronic, stable.    5. Impacted cerumen of both ears  Patient requested to have ear lavage completed today.  See procedure note above.      HCM: Completed.  Labs per orders.  Vaccinations per orders.  Counseling about diet, supplements, exercise, skin care and safe sex.      Follow-up: Return in about 1 year (around 5/1/2026), or if symptoms worsen or fail to improve, for Annual.

## 2025-05-02 ENCOUNTER — PATIENT MESSAGE (OUTPATIENT)
Dept: MEDICAL GROUP | Facility: PHYSICIAN GROUP | Age: 55
End: 2025-05-02
Payer: MEDICARE

## 2025-05-02 DIAGNOSIS — E78.5 DYSLIPIDEMIA: ICD-10-CM

## 2025-05-02 DIAGNOSIS — Z13.1 ENCOUNTER FOR SCREENING FOR DIABETES MELLITUS: ICD-10-CM

## 2025-05-02 DIAGNOSIS — I10 ESSENTIAL HYPERTENSION: ICD-10-CM

## 2025-05-02 DIAGNOSIS — E55.9 VITAMIN D DEFICIENCY: ICD-10-CM

## 2025-05-02 DIAGNOSIS — F91.8 CONDUCT DISORDER, UNDIFFERENTIATED TYPE: ICD-10-CM

## 2025-05-22 ENCOUNTER — HOSPITAL ENCOUNTER (OUTPATIENT)
Dept: LAB | Facility: MEDICAL CENTER | Age: 55
End: 2025-05-22
Attending: STUDENT IN AN ORGANIZED HEALTH CARE EDUCATION/TRAINING PROGRAM
Payer: MEDICARE

## 2025-05-22 DIAGNOSIS — F91.8 CONDUCT DISORDER, UNDIFFERENTIATED TYPE: ICD-10-CM

## 2025-05-22 DIAGNOSIS — I10 ESSENTIAL HYPERTENSION: ICD-10-CM

## 2025-05-22 DIAGNOSIS — Z00.00 WELLNESS EXAMINATION: ICD-10-CM

## 2025-05-22 DIAGNOSIS — Z13.1 ENCOUNTER FOR SCREENING FOR DIABETES MELLITUS: ICD-10-CM

## 2025-05-22 DIAGNOSIS — E78.5 DYSLIPIDEMIA: ICD-10-CM

## 2025-05-22 DIAGNOSIS — E55.9 VITAMIN D DEFICIENCY: ICD-10-CM

## 2025-05-22 DIAGNOSIS — Z12.5 SCREENING FOR PROSTATE CANCER: ICD-10-CM

## 2025-05-22 LAB
25(OH)D3 SERPL-MCNC: 33 NG/ML (ref 30–100)
ALBUMIN SERPL BCP-MCNC: 4.1 G/DL (ref 3.2–4.9)
ALBUMIN/GLOB SERPL: 1.8 G/DL
ALP SERPL-CCNC: 96 U/L (ref 30–99)
ALT SERPL-CCNC: 16 U/L (ref 2–50)
ANION GAP SERPL CALC-SCNC: 12 MMOL/L (ref 7–16)
AST SERPL-CCNC: 18 U/L (ref 12–45)
BASOPHILS # BLD AUTO: 0.5 % (ref 0–1.8)
BASOPHILS # BLD: 0.03 K/UL (ref 0–0.12)
BILIRUB SERPL-MCNC: 0.4 MG/DL (ref 0.1–1.5)
BUN SERPL-MCNC: 27 MG/DL (ref 8–22)
CALCIUM ALBUM COR SERPL-MCNC: 8.6 MG/DL (ref 8.5–10.5)
CALCIUM SERPL-MCNC: 8.7 MG/DL (ref 8.5–10.5)
CHLORIDE SERPL-SCNC: 107 MMOL/L (ref 96–112)
CHOLEST SERPL-MCNC: 140 MG/DL (ref 100–199)
CO2 SERPL-SCNC: 22 MMOL/L (ref 20–33)
CREAT SERPL-MCNC: 0.9 MG/DL (ref 0.5–1.4)
EOSINOPHIL # BLD AUTO: 0.32 K/UL (ref 0–0.51)
EOSINOPHIL NFR BLD: 5.4 % (ref 0–6.9)
ERYTHROCYTE [DISTWIDTH] IN BLOOD BY AUTOMATED COUNT: 42.1 FL (ref 35.9–50)
EST. AVERAGE GLUCOSE BLD GHB EST-MCNC: 114 MG/DL
FASTING STATUS PATIENT QL REPORTED: NORMAL
GFR SERPLBLD CREATININE-BSD FMLA CKD-EPI: 101 ML/MIN/1.73 M 2
GLOBULIN SER CALC-MCNC: 2.3 G/DL (ref 1.9–3.5)
GLUCOSE SERPL-MCNC: 86 MG/DL (ref 65–99)
HBA1C MFR BLD: 5.6 % (ref 4–5.6)
HCT VFR BLD AUTO: 46 % (ref 42–52)
HDLC SERPL-MCNC: 33 MG/DL
HGB BLD-MCNC: 15 G/DL (ref 14–18)
IMM GRANULOCYTES # BLD AUTO: 0.04 K/UL (ref 0–0.11)
IMM GRANULOCYTES NFR BLD AUTO: 0.7 % (ref 0–0.9)
LDLC SERPL CALC-MCNC: 84 MG/DL
LYMPHOCYTES # BLD AUTO: 1.77 K/UL (ref 1–4.8)
LYMPHOCYTES NFR BLD: 30 % (ref 22–41)
MCH RBC QN AUTO: 30.4 PG (ref 27–33)
MCHC RBC AUTO-ENTMCNC: 32.6 G/DL (ref 32.3–36.5)
MCV RBC AUTO: 93.1 FL (ref 81.4–97.8)
MONOCYTES # BLD AUTO: 0.4 K/UL (ref 0–0.85)
MONOCYTES NFR BLD AUTO: 6.8 % (ref 0–13.4)
NEUTROPHILS # BLD AUTO: 3.34 K/UL (ref 1.82–7.42)
NEUTROPHILS NFR BLD: 56.6 % (ref 44–72)
NRBC # BLD AUTO: 0 K/UL
NRBC BLD-RTO: 0 /100 WBC (ref 0–0.2)
PLATELET # BLD AUTO: 123 K/UL (ref 164–446)
PMV BLD AUTO: 11.1 FL (ref 9–12.9)
POTASSIUM SERPL-SCNC: 4.2 MMOL/L (ref 3.6–5.5)
PROLACTIN SERPL-MCNC: 34.2 NG/ML (ref 2.1–17.7)
PROT SERPL-MCNC: 6.4 G/DL (ref 6–8.2)
PSA SERPL DL<=0.01 NG/ML-MCNC: 0.79 NG/ML (ref 0–4)
RBC # BLD AUTO: 4.94 M/UL (ref 4.7–6.1)
SODIUM SERPL-SCNC: 141 MMOL/L (ref 135–145)
TRIGL SERPL-MCNC: 117 MG/DL (ref 0–149)
VIT B12 SERPL-MCNC: 786 PG/ML (ref 211–911)
WBC # BLD AUTO: 5.9 K/UL (ref 4.8–10.8)

## 2025-05-22 PROCEDURE — 84153 ASSAY OF PSA TOTAL: CPT

## 2025-05-22 PROCEDURE — 82306 VITAMIN D 25 HYDROXY: CPT

## 2025-05-22 PROCEDURE — 36415 COLL VENOUS BLD VENIPUNCTURE: CPT

## 2025-05-22 PROCEDURE — 80061 LIPID PANEL: CPT

## 2025-05-22 PROCEDURE — 83036 HEMOGLOBIN GLYCOSYLATED A1C: CPT

## 2025-05-22 PROCEDURE — 80053 COMPREHEN METABOLIC PANEL: CPT

## 2025-05-22 PROCEDURE — 82607 VITAMIN B-12: CPT

## 2025-05-22 PROCEDURE — 84146 ASSAY OF PROLACTIN: CPT

## 2025-05-22 PROCEDURE — 85025 COMPLETE CBC W/AUTO DIFF WBC: CPT

## 2025-05-23 ENCOUNTER — RESULTS FOLLOW-UP (OUTPATIENT)
Dept: MEDICAL GROUP | Facility: PHYSICIAN GROUP | Age: 55
End: 2025-05-23

## 2025-05-23 DIAGNOSIS — R79.89 PROLACTIN INCREASED: Primary | ICD-10-CM

## 2025-06-04 NOTE — PROGRESS NOTES
"Subjective     Stephane Montiel is a 55 y.o. male who presents with Lung Cancer Screening Program Prescreen and Nicotine Dependence            HPI  Patient seen today for initial lung cancer screening visit. Patient referred by Dr. Violetta Reddy. He is here with his , Tena.  He lives in a 24 hour home due to not following up with doctor's appts on his own.  His  helps schedule his appointments, chores and ADLS.  Tena states that he makes his own medical decisions regarding imaging.    The patient meets eligibility criteria including age, smoking history (20+ pack years), if former smoker, quit in the last 15 years, and absence of signs or symptoms of lung cancer.    - Age - 55  - Smoking history - Patient has smoked for 33 years at an average of 0.75 ppd = 25 pack year smoking history.  - Current smoking status - current  - No symptoms of lung cancer and no previous history of lung cancer     Allergies[1]    Medications Ordered Prior to Encounter[2]    Review of Systems   Constitutional:  Negative for chills, fever and weight loss.   Respiratory:  Negative for hemoptysis, sputum production, shortness of breath and wheezing.         He has had a chronic cough for years (dry) which has been stable for years.   Cardiovascular:  Negative for chest pain and palpitations.              Objective     /66 (BP Location: Right arm, Patient Position: Sitting, BP Cuff Size: Adult)   Pulse (!) 105   Resp 18   Ht 1.854 m (6' 1\")   Wt 101 kg (222 lb)   SpO2 95%   BMI 29.29 kg/m²      Physical Exam  Constitutional:       Appearance: Normal appearance.   Cardiovascular:      Rate and Rhythm: Normal rate and regular rhythm.   Pulmonary:      Effort: Pulmonary effort is normal.      Breath sounds: Normal breath sounds.   Musculoskeletal:         General: No swelling.   Neurological:      Mental Status: He is alert.                                  Assessment & Plan  Cigarette smoker    Orders:    " CT-LUNG CANCER-SCREENING; Future       We conducted a shared decision-making process using a decision aid. We reviewed benefits and harms of screening, including false positives and potential need for additional diagnostic testing, the possibility of over diagnosis, and total radiation exposure.    We discussed the importance of adhering to annual LDCT screening. We also discussed the impact of comorbities on the patient's the ability or willingness to undergo diagnostic procedure(s) and treatment.    Counseling on the importance of maintaining cigarette smoking abstinence if former smoker; or the importance of smoking cessation if current smoker and, if appropriate, furnishing of information about tobacco cessation interventions.    Based on our discussion, we have decided to begin annual lung cancer screening starting now.  Stephane would like me to call is , Tena, with the results and she will help coordinate follow-up.    No follow-up needed                   [1] No Known Allergies  [2]   Current Outpatient Medications on File Prior to Visit   Medication Sig Dispense Refill    risperiDONE (RISPERDAL) 0.5 MG Tab Take 0.25 mg by mouth 2 times a day.      lurasidone (LATUDA) 80 MG Tab Take 80 mg by mouth with dinner.      cloNIDine (CATAPRES) 0.1 MG Tab Take 0.1 mg by mouth every evening.      lamotrigine (LAMICTAL) 200 MG tablet Take 2 Tablets by mouth every day.      venlafaxine (EFFEXOR) 75 MG Tab Take 75 mg by mouth 3 times a day.      Multiple Vitamin (MULTIVITAMIN) Tab Take 1 Tablet by mouth every day. NEEDS APPOINTMENT FOR FUTURE REFILLS 28 Tablet 0    prazosin (MINIPRESS) 2 MG Cap Take 2 mg by mouth 2 times a day.       No current facility-administered medications on file prior to visit.

## 2025-06-05 NOTE — Clinical Note
REFERRAL APPROVAL NOTICE         Sent on June 5, 2025                   Stephane Montiel  [address] [facility] [place] [address]                   Dear Mr. Montiel,    After a careful review of the medical information and benefit coverage, [facility] has processed your referral. See below for additional details.    If applicable, you must be actively enrolled with your insurance for coverage of the authorized service. If you have any questions regarding your coverage, please contact your insurance directly.    REFERRAL INFORMATION   Referral #:  [id number]  Referred-To Department    Referred-By Provider:  Endocrinology    Violetta Reddy M.D.   Endocrinology [facility]      [address] [facility] [address]  [phone] [address], [address]  [facility] [id number] [phone]    Referral Start Date:  05/23/2025  Referral End Date:   05/23/2026           SCHEDULING  If you do not already have an appointment, please call [phone] to make an appointment.   MORE INFORMATION  As a reminder, [facility] ownership has changed, meaning this location is now owned and operated by [facility]. As such, we want to clarify that our patients should expect to receive two separate bills for the services received at [facility] - one representing the [facility] facility fees as the owner of the establishment, and the other to represent the physician's services and subsequent fees. You can speak with your insurance carrier for a pricing estimate by calling the customer service number on the back of your card and ask about charges for a hospital outpatient visit.  If you do not already have a [employer] account, sign up at: [email or web address]  You can access your medical information, make appointments, see lab results, billing information, and more.  If you have questions regarding this referral, please contact  the [facility] Referrals department at:             [phone]. Monday - Friday  7:30AM - 5:00PM.      Sincerely,  [facility]

## 2025-06-09 ENCOUNTER — OFFICE VISIT (OUTPATIENT)
Dept: SLEEP MEDICINE | Facility: MEDICAL CENTER | Age: 55
End: 2025-06-09
Attending: FAMILY MEDICINE
Payer: MEDICARE

## 2025-06-09 VITALS
DIASTOLIC BLOOD PRESSURE: 66 MMHG | HEIGHT: 73 IN | WEIGHT: 222 LBS | OXYGEN SATURATION: 95 % | BODY MASS INDEX: 29.42 KG/M2 | RESPIRATION RATE: 18 BRPM | HEART RATE: 105 BPM | SYSTOLIC BLOOD PRESSURE: 110 MMHG

## 2025-06-09 DIAGNOSIS — F17.210 CIGARETTE SMOKER: Primary | ICD-10-CM

## 2025-06-09 PROCEDURE — G0296 VISIT TO DETERM LDCT ELIG: HCPCS | Performed by: FAMILY MEDICINE

## 2025-06-09 ASSESSMENT — ENCOUNTER SYMPTOMS
PALPITATIONS: 0
SPUTUM PRODUCTION: 0
CHILLS: 0
HEMOPTYSIS: 0
SHORTNESS OF BREATH: 0
WEIGHT LOSS: 0
FEVER: 0
WHEEZING: 0

## 2025-06-09 ASSESSMENT — FIBROSIS 4 INDEX: FIB4 SCORE: 2.01

## 2025-06-09 NOTE — Clinical Note
Thank you for referring Stephane to the Lung Cancer Screening program.  I enrolled him today. I will update you re: abnormal findings. -Dr. Macey Ribeiro

## 2025-06-11 ENCOUNTER — TELEPHONE (OUTPATIENT)
Dept: HEALTH INFORMATION MANAGEMENT | Facility: OTHER | Age: 55
End: 2025-06-11
Payer: MEDICARE

## 2025-06-11 NOTE — TELEPHONE ENCOUNTER
Outcome: Left Message    Please transfer to [facility] Outbound [phone] when patient returns call.    Attempt 1

## 2025-06-18 RX ORDER — MULTIVITAMIN
1 TABLET ORAL DAILY
Qty: 28 TABLET | Refills: 0 | Status: SHIPPED | OUTPATIENT
Start: 2025-06-18

## 2025-06-18 NOTE — TELEPHONE ENCOUNTER
Received request via: Pharmacy    Was the patient seen in the last year in this department? Yes    Does the patient have an active prescription (recently filled or refills available) for medication(s) requested? No    Pharmacy Name: [facility] pharmacy    Does the patient have long term Plus and need 100-day supply? (This applies to ALL medications) Yes, quantity updated to 100 days

## 2025-06-24 ENCOUNTER — OFFICE VISIT (OUTPATIENT)
Dept: ENDOCRINOLOGY | Facility: MEDICAL CENTER | Age: 55
End: 2025-06-24
Attending: INTERNAL MEDICINE
Payer: MEDICARE

## 2025-06-24 VITALS
WEIGHT: 223.5 LBS | BODY MASS INDEX: 29.62 KG/M2 | DIASTOLIC BLOOD PRESSURE: 80 MMHG | SYSTOLIC BLOOD PRESSURE: 124 MMHG | HEIGHT: 73 IN | OXYGEN SATURATION: 94 % | HEART RATE: 81 BPM

## 2025-06-24 DIAGNOSIS — R79.89 ELEVATED PROLACTIN LEVEL: Primary | ICD-10-CM

## 2025-06-24 DIAGNOSIS — R79.89 ABNORMAL TSH: ICD-10-CM

## 2025-06-24 DIAGNOSIS — E22.1 SECONDARY HYPERPROLACTINEMIA (HCC): ICD-10-CM

## 2025-06-24 PROCEDURE — 99213 OFFICE O/P EST LOW 20 MIN: CPT | Performed by: INTERNAL MEDICINE

## 2025-06-24 ASSESSMENT — FIBROSIS 4 INDEX: FIB4 SCORE: 2.01

## 2025-06-24 NOTE — PROGRESS NOTES
Chief Complaint: Consult requested by Violetta Reddy M.D. for evaluation of Elevated prolactin levels    HPI:     EUFEMIA Montiel is a 55 y.o. male with ADD, Intellectual disability, conduct disorder he is on longstanding antipsychotic agent therapy with Risperdal managed by Dr. Marino from [facility] psychiatry.    He was referred for evaluation because of elevated prolactin of 34 on May 2025    TSH has not been done recently.    Previous TSH levels however were normal at 2.4 in 2015    He denies headaches and blurring of vision  He denies gynecomastia          Patient's medications, allergies, and social histories were reviewed and updated as appropriate.      ROS:     CONS:     No fever, no chills, no weight loss, no fatigue   EYES:      No diplopia, no blurry vision, no redness of eyes, no swelling of eyelids   ENT:    No hearing loss, No ear pain, No sore throat, no dysphagia, no neck swelling   CV:     No chest pain, no palpitations, no claudication, no orthopnea, no PND   PULM:    No SOB, no cough, no hemoptysis, no wheezing    GI:   No nausea, no vomiting, no diarrhea, no constipation, no bloody stools   :  Passing urine well, no dysuria, no hematuria   ENDO:   No polyuria, no polydipsia, no heat intolerance, no cold intolerance   NEURO: No headaches, no dizziness, no convulsions, no tremors   MUSC:  No joint swellings, no arthralgias, no myalgias, no weakness   SKIN:   No rash, no ulcers, no dry skin   PSYCH:   No depression, no anxiety, no difficulty sleeping       Past Medical History:  Patient Active Problem List    Diagnosis Date Noted    Obesity (BMI 30-39.9) 04/15/2019    Tobacco user 09/28/2015    Essential hypertension 08/05/2015    Idiopathic thrombocytopenic purpura (HCC) 08/05/2015    Dyslipidemia 03/03/2014    ADD (attention deficit disorder) 02/04/2014    Intellectual disability     Conduct disorder, undifferentiated type        Past Surgical History:  Past Surgical History[1]  "    Allergies:  Patient has no known allergies.     Current Medications:  Current Medications[2]    Social History:  Social History     Socioeconomic History    Marital status: Single     Spouse name: Not on file    Number of children: Not on file    Years of education: Not on file    Highest education level: Not on file   Occupational History    Not on file   Tobacco Use    Smoking status: Every Day     Current packs/day: 0.75     Average packs/day: 0.8 packs/day for 33.5 years (25.1 ttl pk-yrs)     Types: Cigarettes     Start date: 1992    Smokeless tobacco: Never    Tobacco comments:     started at age 21   Vaping Use    Vaping status: Never Used   Substance and Sexual Activity    Alcohol use: Not Currently     Comment: on holidays    Drug use: No    Sexual activity: Not Currently     Partners: Female     Birth control/protection: Condom   Other Topics Concern    Not on file   Social History Narrative    Not on file     Social Drivers of Health     Financial Resource Strain: Not on file   Food Insecurity: Not on file   Transportation Needs: Not on file   Physical Activity: Not on file   Stress: Not on file   Social Connections: Not on file   Intimate Partner Violence: Not on file   Housing Stability: Not on file        Family History:   No family history on file.      PHYSICAL EXAM:   Vital signs: /80 (BP Location: Left arm, Patient Position: Sitting, BP Cuff Size: Adult)   Pulse 81   Ht 1.854 m (6' 1\")   Wt 101 kg (223 lb 8 oz)   SpO2 94%   BMI 29.49 kg/m²   GENERAL: Well-developed, well-nourished  in no apparent distress.   EYE: No ocular and eyelid asymmetry, Anicteric sclerae,  PERRL  HENT: Hearing grossly intact, Normocephalic, atraumatic. Pink, moist mucous membranes, No exudate  NECK: Supple. Trachea midline. thyroid is normal in size without nodules or tenderness  CARDIOVASCULAR: Regular rate and rhythm. No murmurs, rubs, or gallops.   LUNGS: Clear to auscultation bilaterally   ABDOMEN: Soft, " "nontender with positive bowel sounds.   EXTREMITIES: No clubbing, cyanosis, or edema.   NEUROLOGICAL: Cranial nerves II-XII are grossly intact   Symmetric reflexes at the patella no proximal muscle weakness  LYMPH: No cervical, supraclavicular,  adenopathy palpated.   SKIN: No rashes, lesions. Turgor is normal.    Labs:  Lab Results   Component Value Date/Time    WBC 5.9 05/22/2025 08:33 AM    RBC 4.94 05/22/2025 08:33 AM    HEMOGLOBIN 15.0 05/22/2025 08:33 AM    MCV 93.1 05/22/2025 08:33 AM    MCH 30.4 05/22/2025 08:33 AM    MCHC 32.6 05/22/2025 08:33 AM    RDW 42.1 05/22/2025 08:33 AM    MPV 11.1 05/22/2025 08:33 AM       Lab Results   Component Value Date/Time    SODIUM 141 05/22/2025 08:33 AM    POTASSIUM 4.2 05/22/2025 08:33 AM    CHLORIDE 107 05/22/2025 08:33 AM    CO2 22 05/22/2025 08:33 AM    ANION 12.0 05/22/2025 08:33 AM    GLUCOSE 86 05/22/2025 08:33 AM    BUN 27 (H) 05/22/2025 08:33 AM    CREATININE 0.90 05/22/2025 08:33 AM    CALCIUM 8.7 05/22/2025 08:33 AM    ASTSGOT 18 05/22/2025 08:33 AM    ALTSGPT 16 05/22/2025 08:33 AM    TBILIRUBIN 0.4 05/22/2025 08:33 AM    ALBUMIN 4.1 05/22/2025 08:33 AM    TOTPROTEIN 6.4 05/22/2025 08:33 AM    GLOBULIN 2.3 05/22/2025 08:33 AM    AGRATIO 1.8 05/22/2025 08:33 AM       Lab Results   Component Value Date/Time    CHOLSTRLTOT 140 05/22/2025 0833    TRIGLYCERIDE 117 05/22/2025 0833    HDL 33 (A) 05/22/2025 0833    LDL 84 05/22/2025 0833       Lab Results   Component Value Date/Time    TSHULTRASEN 2.400 05/20/2015 0810     Lab Results   Component Value Date/Time    FREET4 0.82 05/20/2015 0810     No results found for: \"FREET3\"  No results found for: \"THYSTIMIG\"    No results found for: \"MICROSOMALA\"      Imaging:      ASSESSMENT/PLAN:       1. Elevated prolactin level  Uncontrolled  Prolactin levels are mildly elevated because of antipsychotic agent therapy  Treatment with dopamine agonist are not recommended because this may potentially counteract the effects of his " antipsychotic agents    I therefore recommend follow-up with his psychiatrist and recommend the addition of Abilify which can dampen or lower his prolactin levels back to normal without affecting his antipsychotic agent therapy    Studies have shown that Abilify is a partial dopamine agonist and can lower prolactin levels and is useful among patients with drug-induced hyperprolactinemia particularly from antipsychotic agents    At this time I am not making him an appoint with endocrinology but he is more than welcome to come back if medically necessary    2. Secondary hyperprolactinemia (HCC)  See plan above  Patient has drug-induced hyperprolactinemia from antipsychotic agent therapy    3. Abnormal TSH  Stable I want to check his thyroid function test to make sure that this is not a secondary cause of his hyperprolactinemia I will update his caregiver about his test results      Return if symptoms worsen or fail to improve.    Total time spent on day of service was over 60 minutes which included obtaining a detailed history and physical exam, ordering labs, coordinating care and scheduling future follow-up      Thank you kindly for allowing me to participate in the thyroid care plan for this patient.    Judd Garcia MD, [facility], [facility]  06/24/25    CC:   Violetta Reddy M.D.         [1]   Past Surgical History:  Procedure Laterality Date    CATARACT EXTRACTION WITH IOL Left 06/06/2022    CATARACT EXTRACTION WITH IOL Right 05/23/2022   [2]   Current Outpatient Medications:     Multiple Vitamin (MULTIVITAMIN) Tab, Take 1 Tablet by mouth every day. NEEDS APPOINTMENT FOR FUTURE REFILLS, Disp: 28 Tablet, Rfl: 0    risperiDONE (RISPERDAL) 0.5 MG Tab, Take 0.25 mg by mouth 2 times a day., Disp: , Rfl:     lurasidone (LATUDA) 80 MG Tab, Take 80 mg by mouth with dinner., Disp: , Rfl:     cloNIDine (CATAPRES) 0.1 MG Tab, Take 0.1 mg by mouth every evening., Disp: , Rfl:     lamotrigine (LAMICTAL) 200 MG tablet, Take 2 Tablets by  mouth every day., Disp: , Rfl:     venlafaxine (EFFEXOR) 75 MG Tab, Take 75 mg by mouth 3 times a day., Disp: , Rfl:     prazosin (MINIPRESS) 2 MG Cap, Take 2 mg by mouth 2 times a day., Disp: , Rfl:

## 2025-06-27 ENCOUNTER — HOSPITAL ENCOUNTER (OUTPATIENT)
Dept: RADIOLOGY | Facility: MEDICAL CENTER | Age: 55
End: 2025-06-27
Attending: FAMILY MEDICINE
Payer: MEDICARE

## 2025-06-27 DIAGNOSIS — F17.210 CIGARETTE SMOKER: ICD-10-CM

## 2025-06-27 PROCEDURE — 71271 CT THORAX LUNG CANCER SCR C-: CPT

## 2025-06-30 ENCOUNTER — RESULTS FOLLOW-UP (OUTPATIENT)
Dept: SLEEP MEDICINE | Facility: MEDICAL CENTER | Age: 55
End: 2025-06-30

## 2025-07-02 RX ORDER — DOXEPIN HYDROCHLORIDE 50 MG/1
CAPSULE ORAL
Qty: 28 TABLET | Refills: 10 | Status: SHIPPED | OUTPATIENT
Start: 2025-07-02

## 2025-07-02 NOTE — TELEPHONE ENCOUNTER
Received request via: Pharmacy    Was the patient seen in the last year in this department? Yes    Does the patient have an active prescription (recently filled or refills available) for medication(s) requested? No    [facility]    Does the patient have group home Plus and need 100-day supply? (This applies to ALL medications) n/a

## 2026-05-07 ENCOUNTER — APPOINTMENT (OUTPATIENT)
Dept: MEDICAL GROUP | Facility: PHYSICIAN GROUP | Age: 56
End: 2026-05-07
Payer: MEDICARE